# Patient Record
Sex: FEMALE | Race: OTHER | HISPANIC OR LATINO
[De-identification: names, ages, dates, MRNs, and addresses within clinical notes are randomized per-mention and may not be internally consistent; named-entity substitution may affect disease eponyms.]

---

## 2017-03-17 ENCOUNTER — RX RENEWAL (OUTPATIENT)
Age: 42
End: 2017-03-17

## 2017-04-20 ENCOUNTER — APPOINTMENT (OUTPATIENT)
Dept: PLASTIC SURGERY | Facility: CLINIC | Age: 42
End: 2017-04-20

## 2017-04-20 ENCOUNTER — OUTPATIENT (OUTPATIENT)
Dept: OUTPATIENT SERVICES | Facility: HOSPITAL | Age: 42
LOS: 1 days | End: 2017-04-20

## 2017-04-20 VITALS
TEMPERATURE: 97.8 F | HEART RATE: 82 BPM | SYSTOLIC BLOOD PRESSURE: 117 MMHG | BODY MASS INDEX: 28.41 KG/M2 | RESPIRATION RATE: 14 BRPM | DIASTOLIC BLOOD PRESSURE: 73 MMHG | WEIGHT: 176 LBS

## 2017-04-20 DIAGNOSIS — Z01.89 ENCOUNTER FOR OTHER SPECIFIED SPECIAL EXAMINATIONS: ICD-10-CM

## 2017-04-20 DIAGNOSIS — Z78.9 OTHER SPECIFIED HEALTH STATUS: ICD-10-CM

## 2017-05-16 ENCOUNTER — OUTPATIENT (OUTPATIENT)
Dept: OUTPATIENT SERVICES | Facility: HOSPITAL | Age: 42
LOS: 1 days | End: 2017-05-16

## 2017-05-16 ENCOUNTER — APPOINTMENT (OUTPATIENT)
Dept: PLASTIC SURGERY | Facility: CLINIC | Age: 42
End: 2017-05-16

## 2017-05-16 DIAGNOSIS — Z01.812 ENCOUNTER FOR PREPROCEDURAL LABORATORY EXAMINATION: ICD-10-CM

## 2017-05-23 ENCOUNTER — LABORATORY RESULT (OUTPATIENT)
Age: 42
End: 2017-05-23

## 2017-05-26 ENCOUNTER — APPOINTMENT (OUTPATIENT)
Dept: HEART AND VASCULAR | Facility: CLINIC | Age: 42
End: 2017-05-26

## 2017-06-02 ENCOUNTER — APPOINTMENT (OUTPATIENT)
Dept: HEART AND VASCULAR | Facility: CLINIC | Age: 42
End: 2017-06-02

## 2017-06-02 VITALS
RESPIRATION RATE: 12 BRPM | HEIGHT: 66 IN | SYSTOLIC BLOOD PRESSURE: 120 MMHG | WEIGHT: 180 LBS | BODY MASS INDEX: 28.93 KG/M2 | HEART RATE: 64 BPM | DIASTOLIC BLOOD PRESSURE: 70 MMHG

## 2017-06-02 DIAGNOSIS — Z83.49 FAMILY HISTORY OF OTHER ENDOCRINE, NUTRITIONAL AND METABOLIC DISEASES: ICD-10-CM

## 2017-06-02 DIAGNOSIS — Z82.49 FAMILY HISTORY OF ISCHEMIC HEART DISEASE AND OTHER DISEASES OF THE CIRCULATORY SYSTEM: ICD-10-CM

## 2017-06-02 DIAGNOSIS — Z83.3 FAMILY HISTORY OF DIABETES MELLITUS: ICD-10-CM

## 2017-06-02 DIAGNOSIS — M62.08 SEPARATION OF MUSCLE (NONTRAUMATIC), OTHER SITE: ICD-10-CM

## 2017-06-05 ENCOUNTER — RX RENEWAL (OUTPATIENT)
Age: 42
End: 2017-06-05

## 2017-06-05 PROBLEM — M62.08 RECTUS DIASTASIS OF LOWER ABDOMEN: Status: ACTIVE | Noted: 2017-06-05

## 2017-06-07 ENCOUNTER — OUTPATIENT (OUTPATIENT)
Dept: OUTPATIENT SERVICES | Facility: HOSPITAL | Age: 42
LOS: 1 days | Discharge: HOME | End: 2017-06-07

## 2017-06-12 DIAGNOSIS — Z41.1 ENCOUNTER FOR COSMETIC SURGERY: ICD-10-CM

## 2017-06-12 DIAGNOSIS — Z87.891 PERSONAL HISTORY OF NICOTINE DEPENDENCE: ICD-10-CM

## 2017-06-12 DIAGNOSIS — E65 LOCALIZED ADIPOSITY: ICD-10-CM

## 2017-06-12 DIAGNOSIS — D57.3 SICKLE-CELL TRAIT: ICD-10-CM

## 2017-06-12 DIAGNOSIS — M62.08 SEPARATION OF MUSCLE (NONTRAUMATIC), OTHER SITE: ICD-10-CM

## 2017-06-12 DIAGNOSIS — E66.9 OBESITY, UNSPECIFIED: ICD-10-CM

## 2017-06-15 ENCOUNTER — OUTPATIENT (OUTPATIENT)
Dept: OUTPATIENT SERVICES | Facility: HOSPITAL | Age: 42
LOS: 1 days | End: 2017-06-15

## 2017-06-15 ENCOUNTER — APPOINTMENT (OUTPATIENT)
Dept: PLASTIC SURGERY | Facility: CLINIC | Age: 42
End: 2017-06-15

## 2017-06-15 VITALS
HEART RATE: 72 BPM | TEMPERATURE: 97.5 F | RESPIRATION RATE: 14 BRPM | SYSTOLIC BLOOD PRESSURE: 110 MMHG | DIASTOLIC BLOOD PRESSURE: 70 MMHG

## 2017-06-15 DIAGNOSIS — Z09 ENCOUNTER FOR FOLLOW-UP EXAMINATION AFTER COMPLETED TREATMENT FOR CONDITIONS OTHER THAN MALIGNANT NEOPLASM: ICD-10-CM

## 2017-06-20 ENCOUNTER — OUTPATIENT (OUTPATIENT)
Dept: OUTPATIENT SERVICES | Facility: HOSPITAL | Age: 42
LOS: 1 days | End: 2017-06-20

## 2017-06-20 ENCOUNTER — APPOINTMENT (OUTPATIENT)
Dept: PLASTIC SURGERY | Facility: CLINIC | Age: 42
End: 2017-06-20

## 2017-06-20 DIAGNOSIS — Z09 ENCOUNTER FOR FOLLOW-UP EXAMINATION AFTER COMPLETED TREATMENT FOR CONDITIONS OTHER THAN MALIGNANT NEOPLASM: ICD-10-CM

## 2017-08-28 ENCOUNTER — APPOINTMENT (OUTPATIENT)
Dept: HEART AND VASCULAR | Facility: CLINIC | Age: 42
End: 2017-08-28
Payer: COMMERCIAL

## 2017-08-28 VITALS
HEART RATE: 75 BPM | SYSTOLIC BLOOD PRESSURE: 120 MMHG | BODY MASS INDEX: 27.32 KG/M2 | DIASTOLIC BLOOD PRESSURE: 70 MMHG | WEIGHT: 170 LBS | HEIGHT: 66 IN

## 2017-08-28 DIAGNOSIS — R31.29 OTHER MICROSCOPIC HEMATURIA: ICD-10-CM

## 2017-08-28 PROCEDURE — 99396 PREV VISIT EST AGE 40-64: CPT | Mod: 25

## 2017-08-28 PROCEDURE — 93000 ELECTROCARDIOGRAM COMPLETE: CPT

## 2017-08-28 RX ORDER — CEPHALEXIN 500 MG/1
500 TABLET ORAL
Qty: 28 | Refills: 0 | Status: DISCONTINUED | COMMUNITY
Start: 2017-06-20 | End: 2017-08-28

## 2017-08-28 RX ORDER — DIAZEPAM 5 MG/1
5 TABLET ORAL
Qty: 20 | Refills: 0 | Status: DISCONTINUED | COMMUNITY
Start: 2017-06-05 | End: 2017-08-28

## 2017-08-28 RX ORDER — ONDANSETRON 4 MG/1
4 TABLET ORAL
Qty: 10 | Refills: 0 | Status: DISCONTINUED | COMMUNITY
Start: 2017-06-05 | End: 2017-08-28

## 2017-08-28 RX ORDER — ENOXAPARIN SODIUM 100 MG/ML
40 INJECTION SUBCUTANEOUS
Qty: 3 | Refills: 0 | Status: DISCONTINUED | COMMUNITY
Start: 2017-06-05 | End: 2017-08-28

## 2017-08-28 RX ORDER — OXYCODONE AND ACETAMINOPHEN 5; 325 MG/1; MG/1
5-325 TABLET ORAL
Qty: 40 | Refills: 0 | Status: DISCONTINUED | COMMUNITY
Start: 2017-06-05 | End: 2017-08-28

## 2017-12-26 ENCOUNTER — RESULT REVIEW (OUTPATIENT)
Age: 42
End: 2017-12-26

## 2018-01-03 ENCOUNTER — APPOINTMENT (OUTPATIENT)
Dept: HEART AND VASCULAR | Facility: CLINIC | Age: 43
End: 2018-01-03
Payer: COMMERCIAL

## 2018-01-03 VITALS
HEART RATE: 76 BPM | HEIGHT: 66 IN | BODY MASS INDEX: 27.32 KG/M2 | SYSTOLIC BLOOD PRESSURE: 110 MMHG | TEMPERATURE: 98.2 F | DIASTOLIC BLOOD PRESSURE: 68 MMHG | WEIGHT: 170 LBS

## 2018-01-03 DIAGNOSIS — R73.9 HYPERGLYCEMIA, UNSPECIFIED: ICD-10-CM

## 2018-01-03 DIAGNOSIS — J11.1 INFLUENZA DUE TO UNIDENTIFIED INFLUENZA VIRUS WITH OTHER RESPIRATORY MANIFESTATIONS: ICD-10-CM

## 2018-01-03 PROCEDURE — 99213 OFFICE O/P EST LOW 20 MIN: CPT

## 2018-01-04 PROBLEM — R73.9 HYPERGLYCEMIA: Status: ACTIVE | Noted: 2018-01-04

## 2018-01-04 PROBLEM — J11.1 INFLUENZA: Status: ACTIVE | Noted: 2018-01-03

## 2018-01-08 LAB
25(OH)D3 SERPL-MCNC: 28 NG/ML
ALBUMIN SERPL ELPH-MCNC: 4.3 G/DL
ALP BLD-CCNC: 54 U/L
ALT SERPL-CCNC: 9 U/L
ANION GAP SERPL CALC-SCNC: 14 MMOL/L
AST SERPL-CCNC: 15 U/L
BASOPHILS # BLD AUTO: 0.01 K/UL
BASOPHILS NFR BLD AUTO: 0.2 %
BILIRUB SERPL-MCNC: 0.4 MG/DL
BUN SERPL-MCNC: 13 MG/DL
CALCIUM SERPL-MCNC: 9.4 MG/DL
CHLORIDE SERPL-SCNC: 103 MMOL/L
CO2 SERPL-SCNC: 25 MMOL/L
CREAT SERPL-MCNC: 0.81 MG/DL
EOSINOPHIL # BLD AUTO: 0.02 K/UL
EOSINOPHIL NFR BLD AUTO: 0.5 %
GLUCOSE SERPL-MCNC: 104 MG/DL
HCT VFR BLD CALC: 36.3 %
HGB BLD-MCNC: 11.9 G/DL
IMM GRANULOCYTES NFR BLD AUTO: 0.2 %
LPL SERPL-CCNC: 34 U/L
LYMPHOCYTES # BLD AUTO: 1.63 K/UL
LYMPHOCYTES NFR BLD AUTO: 37.1 %
MAN DIFF?: NORMAL
MCHC RBC-ENTMCNC: 30.1 PG
MCHC RBC-ENTMCNC: 32.8 GM/DL
MCV RBC AUTO: 91.7 FL
MONOCYTES # BLD AUTO: 0.33 K/UL
MONOCYTES NFR BLD AUTO: 7.5 %
NEUTROPHILS # BLD AUTO: 2.39 K/UL
NEUTROPHILS NFR BLD AUTO: 54.5 %
PLATELET # BLD AUTO: 181 K/UL
POTASSIUM SERPL-SCNC: 4.1 MMOL/L
PROT SERPL-MCNC: 7.6 G/DL
RBC # BLD: 3.96 M/UL
RBC # FLD: 12.9 %
SODIUM SERPL-SCNC: 142 MMOL/L
WBC # FLD AUTO: 4.39 K/UL

## 2018-05-14 ENCOUNTER — APPOINTMENT (OUTPATIENT)
Dept: MAMMOGRAPHY | Facility: HOSPITAL | Age: 43
End: 2018-05-14
Payer: COMMERCIAL

## 2018-05-14 ENCOUNTER — OUTPATIENT (OUTPATIENT)
Dept: OUTPATIENT SERVICES | Facility: HOSPITAL | Age: 43
LOS: 1 days | End: 2018-05-14
Payer: COMMERCIAL

## 2018-05-14 PROCEDURE — 77063 BREAST TOMOSYNTHESIS BI: CPT | Mod: 26

## 2018-05-14 PROCEDURE — 77067 SCR MAMMO BI INCL CAD: CPT

## 2018-05-14 PROCEDURE — 77063 BREAST TOMOSYNTHESIS BI: CPT

## 2018-05-14 PROCEDURE — 77067 SCR MAMMO BI INCL CAD: CPT | Mod: 26

## 2018-06-08 LAB
ALBUMIN SERPL ELPH-MCNC: 4.2 G/DL
ALP BLD-CCNC: 47 U/L
ALT SERPL-CCNC: 11 U/L
ANION GAP SERPL CALC-SCNC: 15 MMOL/L
AST SERPL-CCNC: 14 U/L
BASOPHILS # BLD AUTO: 0.01 K/UL
BASOPHILS NFR BLD AUTO: 0.2 %
BILIRUB SERPL-MCNC: 0.4 MG/DL
BUN SERPL-MCNC: 17 MG/DL
CALCIUM SERPL-MCNC: 9.3 MG/DL
CHLORIDE SERPL-SCNC: 103 MMOL/L
CO2 SERPL-SCNC: 22 MMOL/L
CREAT SERPL-MCNC: 0.82 MG/DL
EOSINOPHIL # BLD AUTO: 0.04 K/UL
EOSINOPHIL NFR BLD AUTO: 0.7 %
GLUCOSE SERPL-MCNC: 91 MG/DL
HCG SERPL-MCNC: <1 MIU/ML
HCT VFR BLD CALC: 36.5 %
HGB BLD-MCNC: 12.1 G/DL
IMM GRANULOCYTES NFR BLD AUTO: 0.2 %
INR PPP: 0.92 RATIO
LYMPHOCYTES # BLD AUTO: 2 K/UL
LYMPHOCYTES NFR BLD AUTO: 33.6 %
MAN DIFF?: NORMAL
MCHC RBC-ENTMCNC: 30.2 PG
MCHC RBC-ENTMCNC: 33.2 GM/DL
MCV RBC AUTO: 91 FL
MONOCYTES # BLD AUTO: 0.53 K/UL
MONOCYTES NFR BLD AUTO: 8.9 %
NEUTROPHILS # BLD AUTO: 3.37 K/UL
NEUTROPHILS NFR BLD AUTO: 56.4 %
PLATELET # BLD AUTO: 166 K/UL
POTASSIUM SERPL-SCNC: 4.2 MMOL/L
PROT SERPL-MCNC: 7.2 G/DL
PT BLD: 10.4 SEC
RBC # BLD: 4.01 M/UL
RBC # FLD: 13.3 %
SODIUM SERPL-SCNC: 140 MMOL/L
WBC # FLD AUTO: 5.96 K/UL

## 2018-08-27 ENCOUNTER — APPOINTMENT (OUTPATIENT)
Dept: HEART AND VASCULAR | Facility: CLINIC | Age: 43
End: 2018-08-27
Payer: COMMERCIAL

## 2018-08-27 ENCOUNTER — TRANSCRIPTION ENCOUNTER (OUTPATIENT)
Age: 43
End: 2018-08-27

## 2018-08-27 VITALS
WEIGHT: 182 LBS | HEART RATE: 66 BPM | TEMPERATURE: 98.3 F | BODY MASS INDEX: 29.25 KG/M2 | DIASTOLIC BLOOD PRESSURE: 60 MMHG | SYSTOLIC BLOOD PRESSURE: 102 MMHG | HEIGHT: 66 IN

## 2018-08-27 DIAGNOSIS — E55.9 VITAMIN D DEFICIENCY, UNSPECIFIED: ICD-10-CM

## 2018-08-27 PROCEDURE — 99396 PREV VISIT EST AGE 40-64: CPT | Mod: 25

## 2018-08-27 PROCEDURE — 93000 ELECTROCARDIOGRAM COMPLETE: CPT

## 2018-08-27 PROCEDURE — 36415 COLL VENOUS BLD VENIPUNCTURE: CPT

## 2018-08-27 RX ORDER — BIOTIN 10 MG
TABLET ORAL
Refills: 0 | Status: DISCONTINUED | COMMUNITY
End: 2018-08-27

## 2018-08-27 RX ORDER — IBUPROFEN 600 MG
TABLET ORAL
Refills: 0 | Status: DISCONTINUED | COMMUNITY
End: 2018-08-27

## 2018-08-27 RX ORDER — OSELTAMIVIR PHOSPHATE 75 MG/1
75 CAPSULE ORAL TWICE DAILY
Qty: 10 | Refills: 0 | Status: DISCONTINUED | COMMUNITY
Start: 2018-01-03 | End: 2018-08-27

## 2018-08-28 LAB
ALBUMIN SERPL ELPH-MCNC: 4.3 G/DL
ALP BLD-CCNC: 47 U/L
ALT SERPL-CCNC: 12 U/L
ANION GAP SERPL CALC-SCNC: 11 MMOL/L
AST SERPL-CCNC: 13 U/L
BASOPHILS # BLD AUTO: 0.01 K/UL
BASOPHILS NFR BLD AUTO: 0.1 %
BILIRUB SERPL-MCNC: 0.4 MG/DL
BUN SERPL-MCNC: 18 MG/DL
CALCIUM SERPL-MCNC: 8.8 MG/DL
CHLORIDE SERPL-SCNC: 102 MMOL/L
CHOLEST SERPL-MCNC: 187 MG/DL
CHOLEST/HDLC SERPL: 3.4 RATIO
CO2 SERPL-SCNC: 26 MMOL/L
CREAT SERPL-MCNC: 0.75 MG/DL
EOSINOPHIL # BLD AUTO: 0.07 K/UL
EOSINOPHIL NFR BLD AUTO: 1 %
GLUCOSE SERPL-MCNC: 95 MG/DL
HBA1C MFR BLD HPLC: 4.9 %
HCT VFR BLD CALC: 37.1 %
HDLC SERPL-MCNC: 55 MG/DL
HGB BLD-MCNC: 12.4 G/DL
IMM GRANULOCYTES NFR BLD AUTO: 0.4 %
LDLC SERPL CALC-MCNC: 105 MG/DL
LYMPHOCYTES # BLD AUTO: 1.75 K/UL
LYMPHOCYTES NFR BLD AUTO: 24.8 %
MAN DIFF?: NORMAL
MCHC RBC-ENTMCNC: 31.3 PG
MCHC RBC-ENTMCNC: 33.4 GM/DL
MCV RBC AUTO: 93.7 FL
MONOCYTES # BLD AUTO: 0.85 K/UL
MONOCYTES NFR BLD AUTO: 12 %
NEUTROPHILS # BLD AUTO: 4.35 K/UL
NEUTROPHILS NFR BLD AUTO: 61.7 %
PLATELET # BLD AUTO: 159 K/UL
POTASSIUM SERPL-SCNC: 4.3 MMOL/L
PROT SERPL-MCNC: 7.1 G/DL
RBC # BLD: 3.96 M/UL
RBC # FLD: 13.3 %
SODIUM SERPL-SCNC: 139 MMOL/L
TRIGL SERPL-MCNC: 136 MG/DL
TSH SERPL-ACNC: 1.25 UIU/ML
WBC # FLD AUTO: 7.06 K/UL

## 2018-09-10 DIAGNOSIS — M62.830 MUSCLE SPASM OF BACK: ICD-10-CM

## 2019-08-06 ENCOUNTER — APPOINTMENT (OUTPATIENT)
Dept: HEART AND VASCULAR | Facility: CLINIC | Age: 44
End: 2019-08-06
Payer: COMMERCIAL

## 2019-08-06 PROCEDURE — 86580 TB INTRADERMAL TEST: CPT

## 2019-08-07 ENCOUNTER — MED ADMIN CHARGE (OUTPATIENT)
Age: 44
End: 2019-08-07

## 2020-01-15 ENCOUNTER — APPOINTMENT (OUTPATIENT)
Dept: HEART AND VASCULAR | Facility: CLINIC | Age: 45
End: 2020-01-15
Payer: COMMERCIAL

## 2020-01-15 VITALS
HEART RATE: 59 BPM | HEIGHT: 66 IN | SYSTOLIC BLOOD PRESSURE: 118 MMHG | BODY MASS INDEX: 30.53 KG/M2 | DIASTOLIC BLOOD PRESSURE: 60 MMHG | WEIGHT: 190 LBS

## 2020-01-15 DIAGNOSIS — I83.899 VARICOSE VEINS OF UNSPECIFIED LOWER EXTREMITY WITH OTHER COMPLICATIONS: ICD-10-CM

## 2020-01-15 DIAGNOSIS — M54.5 LOW BACK PAIN: ICD-10-CM

## 2020-01-15 DIAGNOSIS — L81.9 DISORDER OF PIGMENTATION, UNSPECIFIED: ICD-10-CM

## 2020-01-15 PROCEDURE — 93970 EXTREMITY STUDY: CPT

## 2020-01-15 PROCEDURE — 99213 OFFICE O/P EST LOW 20 MIN: CPT

## 2020-02-05 ENCOUNTER — APPOINTMENT (OUTPATIENT)
Dept: MAMMOGRAPHY | Facility: HOSPITAL | Age: 45
End: 2020-02-05

## 2020-03-29 DIAGNOSIS — Z87.09 PERSONAL HISTORY OF OTHER DISEASES OF THE RESPIRATORY SYSTEM: ICD-10-CM

## 2020-03-29 PROBLEM — M54.5 BACK PAIN, LUMBOSACRAL: Status: ACTIVE | Noted: 2018-01-04

## 2020-03-29 PROBLEM — L81.9 HYPERPIGMENTATION OF SKIN: Status: ACTIVE | Noted: 2017-03-17

## 2020-03-29 PROBLEM — I83.899 SYMPTOMATIC VARICOSE VEINS: Status: ACTIVE | Noted: 2020-03-29

## 2020-04-16 ENCOUNTER — APPOINTMENT (OUTPATIENT)
Dept: HEART AND VASCULAR | Facility: CLINIC | Age: 45
End: 2020-04-16
Payer: COMMERCIAL

## 2020-04-16 DIAGNOSIS — R68.89 OTHER GENERAL SYMPTOMS AND SIGNS: ICD-10-CM

## 2020-04-16 PROCEDURE — 93306 TTE W/DOPPLER COMPLETE: CPT

## 2020-04-17 ENCOUNTER — OUTPATIENT (OUTPATIENT)
Dept: OUTPATIENT SERVICES | Facility: HOSPITAL | Age: 45
LOS: 1 days | End: 2020-04-17

## 2020-04-17 ENCOUNTER — APPOINTMENT (OUTPATIENT)
Dept: CT IMAGING | Facility: CLINIC | Age: 45
End: 2020-04-17
Payer: COMMERCIAL

## 2020-04-17 LAB
BASOPHILS # BLD AUTO: 0.03 K/UL
BASOPHILS NFR BLD AUTO: 0.4 %
CRP SERPL-MCNC: 1.24 MG/DL
DEPRECATED D DIMER PPP IA-ACNC: 611 NG/ML DDU
EOSINOPHIL # BLD AUTO: 0.07 K/UL
EOSINOPHIL NFR BLD AUTO: 1 %
FERRITIN SERPL-MCNC: 302 NG/ML
HCT VFR BLD CALC: 36.8 %
HGB BLD-MCNC: 12.2 G/DL
IMM GRANULOCYTES NFR BLD AUTO: 0.3 %
LYMPHOCYTES # BLD AUTO: 1.86 K/UL
LYMPHOCYTES NFR BLD AUTO: 27.3 %
MAN DIFF?: NORMAL
MCHC RBC-ENTMCNC: 30.2 PG
MCHC RBC-ENTMCNC: 33.2 GM/DL
MCV RBC AUTO: 91.1 FL
MONOCYTES # BLD AUTO: 0.53 K/UL
MONOCYTES NFR BLD AUTO: 7.8 %
NEUTROPHILS # BLD AUTO: 4.3 K/UL
NEUTROPHILS NFR BLD AUTO: 63.2 %
PLATELET # BLD AUTO: 223 K/UL
PROCALCITONIN SERPL-MCNC: 0.05 NG/ML
RBC # BLD: 4.04 M/UL
RBC # FLD: 12.5 %
WBC # FLD AUTO: 6.81 K/UL

## 2020-04-17 PROCEDURE — 71275 CT ANGIOGRAPHY CHEST: CPT | Mod: 26

## 2020-04-19 ENCOUNTER — APPOINTMENT (OUTPATIENT)
Dept: DISASTER EMERGENCY | Facility: CLINIC | Age: 45
End: 2020-04-19

## 2020-04-21 ENCOUNTER — TRANSCRIPTION ENCOUNTER (OUTPATIENT)
Age: 45
End: 2020-04-21

## 2020-04-25 ENCOUNTER — MESSAGE (OUTPATIENT)
Age: 45
End: 2020-04-25

## 2020-04-27 ENCOUNTER — APPOINTMENT (OUTPATIENT)
Dept: HEART AND VASCULAR | Facility: CLINIC | Age: 45
End: 2020-04-27
Payer: COMMERCIAL

## 2020-04-27 PROCEDURE — 99443: CPT

## 2020-04-29 LAB
SARS-COV-2 IGG SERPL IA-ACNC: 95.1 INDEX
SARS-COV-2 IGG SERPL QL IA: POSITIVE

## 2020-04-30 LAB
CRP SERPL-MCNC: 0.28 MG/DL
DEPRECATED D DIMER PPP IA-ACNC: 247 NG/ML DDU

## 2020-04-30 NOTE — HISTORY OF PRESENT ILLNESS
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 After Friday this past week when she felt relentless chest pain with deep breath and any significant movement or twisting, the pain improved over the weekend. The temperature at home averages 99F without Tylenol around the clock, which although not high, does not appear to be her normal temperature. She continues to feel exertional dyspnea and is particularly concerned about RT upper back and chest pain for which she remains on Zithromax 250mg daily (10 day course prescribed, 4 more to go) and is nearing completion of her 5 day course of PLaquenil at 200mg BID today given for documented COVID 19 PNA detected on chest CTA last week and active nasal swab +ity. She is additionally on Eliquis 5 mg BID for significantly elevated D dimer which measured 690 just last wee. We agreed she repeats inflammatory markers and D dimer this week as early as possible so that we can gauge her A/C protocol and length.\par \par We agreed on the following:\par \par 1. She repeats blood work with CRP, D dimer and obtains COVID Ab titers early this week\par 2. Continues T checks and performs trial of at home activity to assess for evidence of persistent exertional SOB\par 3. Continues communication with HR where I recommended she initiates the work responsibilities from home for a week prior to returning to her office given close proximity to other co-workers and office desk sharing. Ideally she should be COVID -ve prior to the return to office premisses under these circumstances and given symptomatic/clinical cvid infection relapse complicated with pneumonia. \par 4. Prior to returning to the office she should be symptoms and T free x 72 hrs w/o use of antipyretics and preferably swabbed for COVID PCR again to determie her carrier / shedding sttaus and protect office staff appropriately.\par 5. Phone F/U with me this Friday to go over labs and A/C protocol

## 2020-05-08 ENCOUNTER — APPOINTMENT (OUTPATIENT)
Dept: HEART AND VASCULAR | Facility: CLINIC | Age: 45
End: 2020-05-08
Payer: COMMERCIAL

## 2020-05-08 DIAGNOSIS — J18.9 PNEUMONIA, UNSPECIFIED ORGANISM: ICD-10-CM

## 2020-05-08 PROCEDURE — 99443: CPT

## 2020-05-12 PROBLEM — J18.9 ATYPICAL PNEUMONIA: Status: ACTIVE | Noted: 2020-04-18

## 2020-05-12 NOTE — HISTORY OF PRESENT ILLNESS
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 Her fever has not been elevated, and both of her children are doing better overall. and seemed to have recovered for the most part. She is concerned about symptom resurgence stating she had completed Zithromax course 3-4 days ago, and feels as she was feeling better while on antibiotics. \par \par PLAN:\par We agreed on the following: \par \par 1. She goes to the nearest [facility] to rule out strep throat\par 2. Repeats inflammatory marker levels (D dimer, CRP, and ferritin-all ordered) within next couple of days and increases Eliquis to 5mg BID for known elevated d Dimer\par 3. She was asked to  second refill of Zithromax 250mg x 14 days and take the whole 2 week course for "atypical pneumonia" regiment given the clear clinical relapse last week. She reports competing Plaquenil x 5 days over a week ago.\par 4. She should remain at home for additional week until CP, SOB and sore throat completely resolve for a minimum of 72 hrs prior to returning to work (I raised concern of co-worker exposure due to the small space and shared desk where she works, as viral shedding is known to persist up to 2-3 weeks post infection in COVID 19 patients).\par \par She understands and agrees with the plan; she will keep me abreast with the timeline of her testing so I can follow up on the results. \par  [Time Spent: ___ minutes] : I have spent [unfilled] minutes with the patient on the telephone

## 2020-05-15 ENCOUNTER — APPOINTMENT (OUTPATIENT)
Dept: HEART AND VASCULAR | Facility: CLINIC | Age: 45
End: 2020-05-15
Payer: COMMERCIAL

## 2020-05-15 PROCEDURE — 99443: CPT

## 2020-05-18 NOTE — HISTORY OF PRESENT ILLNESS
[Verbal consent obtained from patient] : the patient, [unfilled] [FreeTextEntry1] : This was our third televisit follow up after established COVID 19 pneumonia.  Harrison is getting better, however still experiencing some right sided CP with deep breathing. There is no fever and she has been off Tylenol for over a week now. She remains on Zithromax which she started 6 days ago and is due to complete full 14 day course as protocol for atypical pneumonia. On further questioning DR. Harrison  is reporting her palpitations, which were of concern the week she went back to work, are getting better but are still occurring at rest, and usually at night. There is no associated dizziness or lightheadedness. They have clearly improved since she is back on antibiotics. \par Now, maureen asked about her activity tolerance and challenge with walking she does not seem to be able to complete more than 1/2 block without experiencing limiting SOB. This is of concern, particularly as her most recent lab-work from yesterday reveals persistently elevated D dimer which is in fact slightly increasing. When asked about adherence to A/C she admits to full compliance with Eliquis 5mg BID without unusual bruising or bleeding. \par Under the circumstances, I would like to defer her return to work until further follow up in a week when I want her to repeat her COVID 19 testing with PCR / nasal swab. 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 After all we are requesting them to test negative before arriving to the office.  \par \par PLAN:\par 1. I recommend she completes the remaining week of Zithromax and does increase activity (mostly walking) so that her functional capacity can be properly assessed. \par 2. She is tentatively scheduled for COVID 19 PCR nasal swab next Friday May 22nd, and if negative return to work in our office may be tentatively expected for Tuesday May 26th. \par 3. She understands the concerns, and is OK with extension of sick leave under the circumstances. 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 \par 4. be filling the addendum to her sick leave paperwork early next week, and our next follow up is scheduled for Friday, May 22nd. \par 5. She will complete her antibiotics, and knows to call immediately if any unforeseen issues or interval worsening.   [Time Spent: ___ minutes] : I have spent [unfilled] minutes with the patient on the telephone

## 2020-05-22 ENCOUNTER — APPOINTMENT (OUTPATIENT)
Dept: HEART AND VASCULAR | Facility: CLINIC | Age: 45
End: 2020-05-22
Payer: COMMERCIAL

## 2020-05-22 PROCEDURE — 99443: CPT

## 2020-06-01 ENCOUNTER — APPOINTMENT (OUTPATIENT)
Dept: HEART AND VASCULAR | Facility: CLINIC | Age: 45
End: 2020-06-01
Payer: COMMERCIAL

## 2020-06-01 PROCEDURE — 99442: CPT

## 2020-06-01 NOTE — HISTORY OF PRESENT ILLNESS
[Home] : at home, [unfilled] , at the time of the visit. 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 She remains stable and is overall better but still worried with persistent right sided CP both with increased activity and deep breathing. She completed 2 week long ZIthromax 250mg QD course and at this point and remains on anticoagulation with Eliquis 5 mg BID, supportive therapy, Mg supplements, and is resting. Her palpitations are better, and there has been no fever or cough any longer. \par \par We agreed on the following:\par \par 1. She should repeat CTA chest to clarify if there has been increase in infiltration or secondary chest process to account for the persistent pain.\par 2.  Set up an appointment with pulmonary consultant once CT is done and resulted.\par 3. If remains clinically stable and improving by the end of the week she should be able  to resume her work duties next week Monday 6/8/2020.\par 4. We will follow up again this Friday to go over all of the results. [Time Spent: ___ minutes] : I have spent [unfilled] minutes with the patient on the telephone

## 2020-06-05 ENCOUNTER — APPOINTMENT (OUTPATIENT)
Dept: HEART AND VASCULAR | Facility: CLINIC | Age: 45
End: 2020-06-05
Payer: COMMERCIAL

## 2020-06-05 PROCEDURE — 99443: CPT

## 2020-06-05 NOTE — HISTORY OF PRESENT ILLNESS
[Home] : at home, [unfilled] , at the time of the visit. [Verbal consent obtained from patient] : the patient, [unfilled] [FreeTextEntry1] : This was our second follow up this week regarding finalizing clearance to return to work.\par Although CT chest was ordered it has not been done and will be so later next week prior to referral to pulmonary. 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 She had tested her stamina and walked around her home few times reporting complete resolution of palpitations and exertional dyspnea at current activity level. \par The chest pain she was referring to earlier has gotten better, persists and intermittently shoots from the Rt upper chest (just below her clavicle) to the back and Rt shoulder /arm, but is, as she claims,                   " manageable" and not requiring any pain Rx at this time. She sounds better and feel her brain fog has cleared. She feels she is ready to go back to work.\par \par We agreed on the following:\par 1. She remains at home over the weekend and continues her vitamin Rx and Mg supplements at this time\par 2. Return back to work on Monday 6/8/2020, but notify me over the weekend if any change or status worsening.\par 3. Continue Eliquis 5 mg BID and repeat CRP/D dimer on Monday upon her return to work.\par 4> Plan on scheduling chest CTA later next week prior to scheduling appointment with DR. AVA Reynolds (pulmonary) on elective basis. \par 5. PLan on Right Jugular/Subclavian/Axillary Duplex study next Wednesday in our office. [Time Spent: ___ minutes] : I have spent [unfilled] minutes with the patient on the telephone

## 2020-06-09 ENCOUNTER — EMERGENCY (EMERGENCY)
Facility: HOSPITAL | Age: 45
LOS: 1 days | Discharge: HOME | End: 2020-06-09
Attending: EMERGENCY MEDICINE | Admitting: EMERGENCY MEDICINE
Payer: OTHER MISCELLANEOUS

## 2020-06-09 ENCOUNTER — APPOINTMENT (OUTPATIENT)
Dept: HEART AND VASCULAR | Facility: CLINIC | Age: 45
End: 2020-06-09
Payer: COMMERCIAL

## 2020-06-09 ENCOUNTER — NON-APPOINTMENT (OUTPATIENT)
Age: 45
End: 2020-06-09

## 2020-06-09 VITALS
OXYGEN SATURATION: 100 % | HEIGHT: 66 IN | HEART RATE: 75 BPM | TEMPERATURE: 98 F | RESPIRATION RATE: 16 BRPM | SYSTOLIC BLOOD PRESSURE: 122 MMHG | WEIGHT: 184.97 LBS | DIASTOLIC BLOOD PRESSURE: 79 MMHG

## 2020-06-09 VITALS
OXYGEN SATURATION: 97 % | TEMPERATURE: 98 F | HEART RATE: 70 BPM | RESPIRATION RATE: 17 BRPM | SYSTOLIC BLOOD PRESSURE: 117 MMHG | DIASTOLIC BLOOD PRESSURE: 71 MMHG

## 2020-06-09 VITALS
SYSTOLIC BLOOD PRESSURE: 128 MMHG | HEIGHT: 66 IN | DIASTOLIC BLOOD PRESSURE: 74 MMHG | TEMPERATURE: 98.4 F | HEART RATE: 104 BPM

## 2020-06-09 DIAGNOSIS — Z88.9 ALLERGY STATUS TO UNSPECIFIED DRUGS, MEDICAMENTS AND BIOLOGICAL SUBSTANCES: ICD-10-CM

## 2020-06-09 DIAGNOSIS — Z87.09 PERSONAL HISTORY OF OTHER DISEASES OF THE RESPIRATORY SYSTEM: ICD-10-CM

## 2020-06-09 LAB
ALBUMIN SERPL ELPH-MCNC: 4.1 G/DL — SIGNIFICANT CHANGE UP (ref 3.3–5)
ALP SERPL-CCNC: 47 U/L — SIGNIFICANT CHANGE UP (ref 40–120)
ALT FLD-CCNC: 13 U/L — SIGNIFICANT CHANGE UP (ref 10–45)
AMPHET UR-MCNC: NEGATIVE — SIGNIFICANT CHANGE UP
ANION GAP SERPL CALC-SCNC: 15 MMOL/L — SIGNIFICANT CHANGE UP (ref 5–17)
APPEARANCE UR: CLEAR — SIGNIFICANT CHANGE UP
APTT BLD: 28.1 SEC — SIGNIFICANT CHANGE UP (ref 27.5–36.3)
AST SERPL-CCNC: 20 U/L — SIGNIFICANT CHANGE UP (ref 10–40)
BARBITURATES UR SCN-MCNC: NEGATIVE — SIGNIFICANT CHANGE UP
BASOPHILS # BLD AUTO: 0.03 K/UL — SIGNIFICANT CHANGE UP (ref 0–0.2)
BASOPHILS NFR BLD AUTO: 0.5 % — SIGNIFICANT CHANGE UP (ref 0–2)
BENZODIAZ UR-MCNC: NEGATIVE — SIGNIFICANT CHANGE UP
BILIRUB SERPL-MCNC: 0.5 MG/DL — SIGNIFICANT CHANGE UP (ref 0.2–1.2)
BILIRUB UR-MCNC: NEGATIVE — SIGNIFICANT CHANGE UP
BUN SERPL-MCNC: 12 MG/DL — SIGNIFICANT CHANGE UP (ref 7–23)
CALCIUM SERPL-MCNC: 9 MG/DL — SIGNIFICANT CHANGE UP (ref 8.4–10.5)
CHLORIDE SERPL-SCNC: 102 MMOL/L — SIGNIFICANT CHANGE UP (ref 96–108)
CO2 SERPL-SCNC: 20 MMOL/L — LOW (ref 22–31)
COCAINE METAB.OTHER UR-MCNC: NEGATIVE — SIGNIFICANT CHANGE UP
COLOR SPEC: YELLOW — SIGNIFICANT CHANGE UP
CREAT SERPL-MCNC: 0.63 MG/DL — SIGNIFICANT CHANGE UP (ref 0.5–1.3)
CRP SERPL-MCNC: 0.43 MG/DL — HIGH (ref 0–0.4)
D DIMER BLD IA.RAPID-MCNC: 340 NG/ML DDU — HIGH
DIFF PNL FLD: NEGATIVE — SIGNIFICANT CHANGE UP
EOSINOPHIL # BLD AUTO: 0.03 K/UL — SIGNIFICANT CHANGE UP (ref 0–0.5)
EOSINOPHIL NFR BLD AUTO: 0.5 % — SIGNIFICANT CHANGE UP (ref 0–6)
FERRITIN SERPL-MCNC: 201 NG/ML — HIGH (ref 15–150)
FIBRINOGEN PPP-MCNC: 259 MG/DL — SIGNIFICANT CHANGE UP (ref 258–438)
GLUCOSE SERPL-MCNC: 99 MG/DL — SIGNIFICANT CHANGE UP (ref 70–99)
GLUCOSE UR QL: NEGATIVE — SIGNIFICANT CHANGE UP
HCG UR QL: NEGATIVE — SIGNIFICANT CHANGE UP
HCT VFR BLD CALC: 36.6 % — SIGNIFICANT CHANGE UP (ref 34.5–45)
HGB BLD-MCNC: 12.5 G/DL — SIGNIFICANT CHANGE UP (ref 11.5–15.5)
IMM GRANULOCYTES NFR BLD AUTO: 0.2 % — SIGNIFICANT CHANGE UP (ref 0–1.5)
INR BLD: 1.08 — SIGNIFICANT CHANGE UP (ref 0.88–1.16)
KETONES UR-MCNC: NEGATIVE — SIGNIFICANT CHANGE UP
LACTATE SERPL-SCNC: 0.9 MMOL/L — SIGNIFICANT CHANGE UP (ref 0.5–2)
LDH SERPL L TO P-CCNC: 162 U/L — SIGNIFICANT CHANGE UP (ref 50–242)
LEUKOCYTE ESTERASE UR-ACNC: NEGATIVE — SIGNIFICANT CHANGE UP
LYMPHOCYTES # BLD AUTO: 2.09 K/UL — SIGNIFICANT CHANGE UP (ref 1–3.3)
LYMPHOCYTES # BLD AUTO: 31.7 % — SIGNIFICANT CHANGE UP (ref 13–44)
MAGNESIUM SERPL-MCNC: 1.9 MG/DL — SIGNIFICANT CHANGE UP (ref 1.6–2.6)
MCHC RBC-ENTMCNC: 30.5 PG — SIGNIFICANT CHANGE UP (ref 27–34)
MCHC RBC-ENTMCNC: 34.2 GM/DL — SIGNIFICANT CHANGE UP (ref 32–36)
MCV RBC AUTO: 89.3 FL — SIGNIFICANT CHANGE UP (ref 80–100)
METHADONE UR-MCNC: NEGATIVE — SIGNIFICANT CHANGE UP
MONOCYTES # BLD AUTO: 0.46 K/UL — SIGNIFICANT CHANGE UP (ref 0–0.9)
MONOCYTES NFR BLD AUTO: 7 % — SIGNIFICANT CHANGE UP (ref 2–14)
NEUTROPHILS # BLD AUTO: 3.97 K/UL — SIGNIFICANT CHANGE UP (ref 1.8–7.4)
NEUTROPHILS NFR BLD AUTO: 60.1 % — SIGNIFICANT CHANGE UP (ref 43–77)
NITRITE UR-MCNC: NEGATIVE — SIGNIFICANT CHANGE UP
NRBC # BLD: 0 /100 WBCS — SIGNIFICANT CHANGE UP (ref 0–0)
OPIATES UR-MCNC: NEGATIVE — SIGNIFICANT CHANGE UP
PCP SPEC-MCNC: SIGNIFICANT CHANGE UP
PCP UR-MCNC: NEGATIVE — SIGNIFICANT CHANGE UP
PH UR: 6.5 — SIGNIFICANT CHANGE UP (ref 5–8)
PHOSPHATE SERPL-MCNC: 3.6 MG/DL — SIGNIFICANT CHANGE UP (ref 2.5–4.5)
PLATELET # BLD AUTO: 158 K/UL — SIGNIFICANT CHANGE UP (ref 150–400)
POTASSIUM SERPL-MCNC: 3.7 MMOL/L — SIGNIFICANT CHANGE UP (ref 3.5–5.3)
POTASSIUM SERPL-SCNC: 3.7 MMOL/L — SIGNIFICANT CHANGE UP (ref 3.5–5.3)
PROCALCITONIN SERPL-MCNC: 0.04 NG/ML — SIGNIFICANT CHANGE UP (ref 0.02–0.1)
PROT SERPL-MCNC: 7.4 G/DL — SIGNIFICANT CHANGE UP (ref 6–8.3)
PROT UR-MCNC: NEGATIVE MG/DL — SIGNIFICANT CHANGE UP
PROTHROM AB SERPL-ACNC: 12.3 SEC — SIGNIFICANT CHANGE UP (ref 10–12.9)
RBC # BLD: 4.1 M/UL — SIGNIFICANT CHANGE UP (ref 3.8–5.2)
RBC # FLD: 13.1 % — SIGNIFICANT CHANGE UP (ref 10.3–14.5)
SARS-COV-2 RNA SPEC QL NAA+PROBE: SIGNIFICANT CHANGE UP
SODIUM SERPL-SCNC: 137 MMOL/L — SIGNIFICANT CHANGE UP (ref 135–145)
SP GR SPEC: <=1.005 — SIGNIFICANT CHANGE UP (ref 1–1.03)
T4 AB SER-ACNC: 7.52 UG/DL — SIGNIFICANT CHANGE UP (ref 3.17–11.72)
THC UR QL: NEGATIVE — SIGNIFICANT CHANGE UP
TROPONIN T SERPL-MCNC: <0.01 NG/ML — SIGNIFICANT CHANGE UP (ref 0–0.01)
TROPONIN T SERPL-MCNC: <0.01 NG/ML — SIGNIFICANT CHANGE UP (ref 0–0.01)
TSH SERPL-MCNC: 1.68 UIU/ML — SIGNIFICANT CHANGE UP (ref 0.35–4.94)
UROBILINOGEN FLD QL: 0.2 E.U./DL — SIGNIFICANT CHANGE UP
WBC # BLD: 6.59 K/UL — SIGNIFICANT CHANGE UP (ref 3.8–10.5)
WBC # FLD AUTO: 6.59 K/UL — SIGNIFICANT CHANGE UP (ref 3.8–10.5)

## 2020-06-09 PROCEDURE — 36415 COLL VENOUS BLD VENIPUNCTURE: CPT

## 2020-06-09 PROCEDURE — 84100 ASSAY OF PHOSPHORUS: CPT

## 2020-06-09 PROCEDURE — 85610 PROTHROMBIN TIME: CPT

## 2020-06-09 PROCEDURE — 83605 ASSAY OF LACTIC ACID: CPT

## 2020-06-09 PROCEDURE — 93005 ELECTROCARDIOGRAM TRACING: CPT

## 2020-06-09 PROCEDURE — 93000 ELECTROCARDIOGRAM COMPLETE: CPT

## 2020-06-09 PROCEDURE — 86140 C-REACTIVE PROTEIN: CPT

## 2020-06-09 PROCEDURE — 85730 THROMBOPLASTIN TIME PARTIAL: CPT

## 2020-06-09 PROCEDURE — 93306 TTE W/DOPPLER COMPLETE: CPT

## 2020-06-09 PROCEDURE — 87635 SARS-COV-2 COVID-19 AMP PRB: CPT

## 2020-06-09 PROCEDURE — 70498 CT ANGIOGRAPHY NECK: CPT | Mod: 26

## 2020-06-09 PROCEDURE — 71275 CT ANGIOGRAPHY CHEST: CPT | Mod: 26

## 2020-06-09 PROCEDURE — 82728 ASSAY OF FERRITIN: CPT

## 2020-06-09 PROCEDURE — 82962 GLUCOSE BLOOD TEST: CPT

## 2020-06-09 PROCEDURE — 70450 CT HEAD/BRAIN W/O DYE: CPT | Mod: 26

## 2020-06-09 PROCEDURE — 83615 LACTATE (LD) (LDH) ENZYME: CPT

## 2020-06-09 PROCEDURE — 81025 URINE PREGNANCY TEST: CPT

## 2020-06-09 PROCEDURE — 80307 DRUG TEST PRSMV CHEM ANLYZR: CPT

## 2020-06-09 PROCEDURE — 0042T: CPT

## 2020-06-09 PROCEDURE — 84484 ASSAY OF TROPONIN QUANT: CPT

## 2020-06-09 PROCEDURE — 70551 MRI BRAIN STEM W/O DYE: CPT

## 2020-06-09 PROCEDURE — 70498 CT ANGIOGRAPHY NECK: CPT

## 2020-06-09 PROCEDURE — 80053 COMPREHEN METABOLIC PANEL: CPT

## 2020-06-09 PROCEDURE — 71045 X-RAY EXAM CHEST 1 VIEW: CPT

## 2020-06-09 PROCEDURE — 70450 CT HEAD/BRAIN W/O DYE: CPT

## 2020-06-09 PROCEDURE — 85379 FIBRIN DEGRADATION QUANT: CPT

## 2020-06-09 PROCEDURE — 71045 X-RAY EXAM CHEST 1 VIEW: CPT | Mod: 26

## 2020-06-09 PROCEDURE — 84436 ASSAY OF TOTAL THYROXINE: CPT

## 2020-06-09 PROCEDURE — 93010 ELECTROCARDIOGRAM REPORT: CPT

## 2020-06-09 PROCEDURE — 99291 CRITICAL CARE FIRST HOUR: CPT

## 2020-06-09 PROCEDURE — 84479 ASSAY OF THYROID (T3 OR T4): CPT

## 2020-06-09 PROCEDURE — 99291 CRITICAL CARE FIRST HOUR: CPT | Mod: 25

## 2020-06-09 PROCEDURE — 71275 CT ANGIOGRAPHY CHEST: CPT

## 2020-06-09 PROCEDURE — 70496 CT ANGIOGRAPHY HEAD: CPT

## 2020-06-09 PROCEDURE — 99214 OFFICE O/P EST MOD 30 MIN: CPT | Mod: 25

## 2020-06-09 PROCEDURE — 85025 COMPLETE CBC W/AUTO DIFF WBC: CPT

## 2020-06-09 PROCEDURE — 87086 URINE CULTURE/COLONY COUNT: CPT

## 2020-06-09 PROCEDURE — 70496 CT ANGIOGRAPHY HEAD: CPT | Mod: 26

## 2020-06-09 PROCEDURE — 70551 MRI BRAIN STEM W/O DYE: CPT | Mod: 26

## 2020-06-09 PROCEDURE — 85384 FIBRINOGEN ACTIVITY: CPT

## 2020-06-09 PROCEDURE — 81003 URINALYSIS AUTO W/O SCOPE: CPT

## 2020-06-09 PROCEDURE — 84145 PROCALCITONIN (PCT): CPT

## 2020-06-09 PROCEDURE — 84443 ASSAY THYROID STIM HORMONE: CPT

## 2020-06-09 PROCEDURE — 83735 ASSAY OF MAGNESIUM: CPT

## 2020-06-09 RX ORDER — SODIUM CHLORIDE 9 MG/ML
1000 INJECTION INTRAMUSCULAR; INTRAVENOUS; SUBCUTANEOUS ONCE
Refills: 0 | Status: COMPLETED | OUTPATIENT
Start: 2020-06-09 | End: 2020-06-09

## 2020-06-09 RX ORDER — ACETAMINOPHEN 500 MG
1000 TABLET ORAL ONCE
Refills: 0 | Status: COMPLETED | OUTPATIENT
Start: 2020-06-09 | End: 2020-06-09

## 2020-06-09 RX ADMIN — SODIUM CHLORIDE 1000 MILLILITER(S): 9 INJECTION INTRAMUSCULAR; INTRAVENOUS; SUBCUTANEOUS at 16:05

## 2020-06-09 RX ADMIN — Medication 1000 MILLIGRAM(S): at 19:49

## 2020-06-09 NOTE — DISCUSSION/SUMMARY
[FreeTextEntry1] : EMS called to bring to [facility] ER\par Needs the following:\par CT angiogram chest\par CT brain\par blood work inclusing D dimer, CRP, ferittin, \par at least 24 hrs monitoring.

## 2020-06-09 NOTE — ED ADULT TRIAGE NOTE - CHIEF COMPLAINT QUOTE
Pt BIBA CO Dizziness and near syncope.  Per EMS, pt is recovering from COVID (Neg test on May 23) and today at work experience Dizziness and near syncope and her PMD recommends a CTA of head and chest.  #18 placed to Right AC.

## 2020-06-09 NOTE — ED PROVIDER NOTE - CARE PROVIDERS DIRECT ADDRESSES
[email or web address]

## 2020-06-09 NOTE — CONSULT NOTE ADULT - SUBJECTIVE AND OBJECTIVE BOX
**STROKE CODE CONSULT NOTE**    Last known well time/Time of onset of symptoms: 6/9/20 13:00    HPI: 43 yo F with pmhx sickle cell trait, and COVID+ with symptoms beginning on March 25th (tested positive initially on April 1st, since w/ 2 rounds of Azithromycin and hydroxychloroquine of which course ended 3 weeks ago), and placed on Eliquis 5mg BID empirically after noted D-dimer elevation. Pt had CT on April 13th showing b/l COVID-like PNA, but did not need hospitalization at the time. Since, pt has had persistent symptoms including constant right-upper chest discomfort radiating to the back. No changes with inspiration or exertion, and symptoms have improved somewhat since. However, pt is now endorsing a constant fogginess, forgetfulness and headaches with associated SOB w/ exertion & palpitations. Denies any fevers, cough, nausea, vomiting or diarrhea. Today while at her office, pt had new onset episode of lightheadedness while sitting in her chair, during which she also noted palpitations and a tingling sensation to the left side of her lower face, since resolved.    PAST MEDICAL & SURGICAL HISTORY:      FAMILY HISTORY:      SOCIAL HISTORY:  Denies smoking, drinking, or drug use    ROS:  Constitutional: No fever, weight loss or fatigue  Eyes: No eye pain, visual disturbances, or discharge  ENMT:  No difficulty hearing, tinnitus, vertigo; No sinus or throat pain  Neck: No pain or stiffness  Respiratory: No cough, wheezing, chills or hemoptysis  Cardiovascular: No chest pain, palpitations, shortness of breath, dizziness or leg swelling  Gastrointestinal: No abdominal pain. No nausea, vomiting or hematemesis; No diarrhea or constipation. No hematochezia.  Genitourinary: No dysuria, frequency, hematuria or incontinence  Neurological: As per HPI  Skin: No itching, burning, rashes or lesions   Endocrine: No heat or cold intolerance; No hair loss  Musculoskeletal: No joint pain or swelling; No muscle, back or extremity pain  Psychiatric: No depression, anxiety, mood swings or difficulty sleeping  Heme/Lymph: No easy bruising or bleeding gums    MEDICATIONS  (STANDING):  sodium chloride 0.9% Bolus 1000 milliLiter(s) IV Bolus once    MEDICATIONS  (PRN):      Allergies    influenza virus vaccine, inactivated (Unknown)  Pineapple (Rash)    Intolerances        Vital Signs Last 24 Hrs  T(C): 36.7 (09 Jun 2020 13:45), Max: 36.7 (09 Jun 2020 13:45)  T(F): 98.1 (09 Jun 2020 13:45), Max: 98.1 (09 Jun 2020 13:45)  HR: 75 (09 Jun 2020 13:45) (75 - 75)  BP: 122/79 (09 Jun 2020 13:45) (122/79 - 122/79)  BP(mean): --  RR: 16 (09 Jun 2020 13:45) (16 - 16)  SpO2: 100% (09 Jun 2020 13:45) (100% - 100%)    PHYSICAL EXAM:  Constitutional: WDWN; NAD  Cardiovascular: RRR, no appreciable murmurs; no carotid bruits  Neurologic:  -Mental status: Awake, alert and oriented to person, place, and time. Speech is fluent with intact naming, repetition, and comprehension.  Recent and remote memory intact.  Attention/concentration intact.  No dysarthria, no aphasia.  Fund of knowledge appropriate.    -Cranial nerves:  II: Visual fields full to confrontation. Pupils PERRL  III, IV, VI: extraocular movements are intact without nystagmus  V: Facial sensation intact V1 through V3 intact bilaterally  VII: Face is symmetric with normal eye closure and smile, no facial droop.  VIII: hearing is intact to finger rub  IX, X: uvula is midline and soft palate rises symmetrically  XI: Head turning and shoulder shrug intact  XII: Tongue protrudes in the midline    -Motor:  Normal bulk and tone, strength 5/5 in bilateral upper and lower extremities.   strength 5/5.  Rapid alternating movements intact and symmetric. No pronator drift.   -Sensation: Intact to light touch, proprioception, and pinprick.  Intact pain and temperature sense. No neglect. Romberg negative.  -Coordination: No dysmetria on finger-to-nose and heel-to-shin.  No clumsiness.  -Reflexes: 2+ in upper and lower extremities, downgoing toes bilaterally  -Gait: Narrow and steady. No ataxia. Able to perform tandem and heel to toe walk.    NIHSS:  ICH:    Fingerstick Blood Glucose: CAPILLARY BLOOD GLUCOSE  89 (09 Jun 2020 14:21)      POCT Blood Glucose.: 89 mg/dL (09 Jun 2020 13:53)       LABS:      RADIOLOGY & ADDITIONAL STUDIES:    IV-tPA (Y/N):       N                            Bolus time:  Reason IV-tPA not given: Rapidly improving neurologic deficit    ASSESSMENT/PLAN:    44y yo Female w/ PMH coming in with  Likely cardiac **STROKE CODE CONSULT NOTE**    Last known well time/Time of onset of symptoms: 6/9/20 13:00    HPI: 45 yo F with pmhx sickle cell trait, and COVID+ with symptoms beginning on March 25th (tested positive initially on April 1st, since w/ 2 rounds of Azithromycin and hydroxychloroquine of which course ended 3 weeks ago), and placed on Eliquis 5mg BID empirically after noted D-dimer elevation.     COVID course:   Pt had CT on April 13th showing b/l COVID-like PNA, but did not need hospitalization at the time. Since, pt has had persistent symptoms including constant right-upper chest discomfort radiating to the back. No changes with inspiration or exertion, and symptoms have improved somewhat since. However, pt is now endorsing a constant fogginess, forgetfulness and headaches with associated SOB w/ exertion & palpitations. Denies any fevers, cough, nausea, vomiting or diarrhea. Today while at her office, pt had new onset episode of lightheadedness while sitting in her chair, during which she also noted palpitations and a tingling sensation to the left side of her lower face, since resolved.    PAST MEDICAL & SURGICAL HISTORY:      FAMILY HISTORY:      SOCIAL HISTORY:  Denies smoking, drinking, or drug use    ROS:  Constitutional: No fever, weight loss or fatigue  Eyes: No eye pain, visual disturbances, or discharge  ENMT:  No difficulty hearing, tinnitus, vertigo; No sinus or throat pain  Neck: No pain or stiffness  Respiratory: No cough, wheezing, chills or hemoptysis  Cardiovascular: No chest pain, palpitations, shortness of breath, dizziness or leg swelling  Gastrointestinal: No abdominal pain. No nausea, vomiting or hematemesis; No diarrhea or constipation. No hematochezia.  Genitourinary: No dysuria, frequency, hematuria or incontinence  Neurological: As per HPI  Skin: No itching, burning, rashes or lesions   Endocrine: No heat or cold intolerance; No hair loss  Musculoskeletal: No joint pain or swelling; No muscle, back or extremity pain  Psychiatric: No depression, anxiety, mood swings or difficulty sleeping  Heme/Lymph: No easy bruising or bleeding gums    MEDICATIONS  (STANDING):  sodium chloride 0.9% Bolus 1000 milliLiter(s) IV Bolus once    MEDICATIONS  (PRN):      Allergies    influenza virus vaccine, inactivated (Unknown)  Pineapple (Rash)    Intolerances        Vital Signs Last 24 Hrs  T(C): 36.7 (09 Jun 2020 13:45), Max: 36.7 (09 Jun 2020 13:45)  T(F): 98.1 (09 Jun 2020 13:45), Max: 98.1 (09 Jun 2020 13:45)  HR: 75 (09 Jun 2020 13:45) (75 - 75)  BP: 122/79 (09 Jun 2020 13:45) (122/79 - 122/79)  BP(mean): --  RR: 16 (09 Jun 2020 13:45) (16 - 16)  SpO2: 100% (09 Jun 2020 13:45) (100% - 100%)    PHYSICAL EXAM:  Constitutional: WDWN; NAD  Cardiovascular: RRR, no appreciable murmurs; no carotid bruits  Neurologic:  -Mental status: Awake, alert and oriented to person, place, and time. Speech is fluent with intact naming, repetition, and comprehension.  Recent and remote memory intact.  Attention/concentration intact.  No dysarthria, no aphasia.  Fund of knowledge appropriate.    -Cranial nerves:  II: Visual fields full to confrontation. Pupils PERRL  III, IV, VI: extraocular movements are intact without nystagmus  V: Facial sensation intact V1 through V3 intact bilaterally  VII: Face is symmetric with normal eye closure and smile, no facial droop.  VIII: hearing is intact to finger rub  IX, X: uvula is midline and soft palate rises symmetrically  XI: Head turning and shoulder shrug intact  XII: Tongue protrudes in the midline    -Motor:  Normal bulk and tone, strength 5/5 in bilateral upper and lower extremities.   strength 5/5.  Rapid alternating movements intact and symmetric. No pronator drift.   -Sensation: Intact to light touch, proprioception, and pinprick.  Intact pain and temperature sense. No neglect. Romberg negative.  -Coordination: No dysmetria on finger-to-nose and heel-to-shin.  No clumsiness.  -Reflexes: 2+ in upper and lower extremities, downgoing toes bilaterally  -Gait: Narrow and steady. No ataxia. Able to perform tandem and heel to toe walk.    NIHSS:  ICH:    Fingerstick Blood Glucose: CAPILLARY BLOOD GLUCOSE  89 (09 Jun 2020 14:21)      POCT Blood Glucose.: 89 mg/dL (09 Jun 2020 13:53)       LABS:      RADIOLOGY & ADDITIONAL STUDIES:    IV-tPA (Y/N):       N                            Bolus time:  Reason IV-tPA not given: Rapidly improving neurologic deficit    ASSESSMENT/PLAN:    44y yo Female w/ PMH coming in with  Likely cardiac **STROKE CODE CONSULT NOTE**    Last known well time/Time of onset of symptoms: 6/9/20 13:00    HPI: 43 yo F with pmhx sickle cell trait, and COVID+ with symptoms beginning on March 25th (tested positive initially on April 1st, since w/ 2 rounds of Azithromycin and hydroxychloroquine of which course ended 3 weeks ago), and placed on Eliquis 5mg BID empirically after noted D-dimer elevation.   Pt had CT on April 13th showing b/l COVID-like PNA, but did not need hospitalization at the time. Since, pt has had persistent symptoms including constant right-upper chest discomfort radiating to the back. No changes with inspiration or exertion, and symptoms have improved somewhat since. However, pt is now endorsing a constant fogginess, forgetfulness and headaches with associated SOB w/ exertion & palpitations. Yesterday while going up the stairs pt again felt lightheaded, SOB, states her vision b/l went dark but she did not pass out. Today while at her office, at 1:00 pm pt started to feel lightheadedness while sitting in her chair, during which she also noted palpitations and a tingling sensation to the left side of her lower face, since resolved. Pt also noted HA at the time, states she has had many HA's since onset of Covid whereas before she never had HA's. Reports having another bout of HA's the past 3 days off and on behind her L eye.    Pt came to [facility], symptoms resolved, stroke code was called given report of L facial tingling. CTH noncontrast negative for evidence of acute infarct or acute hemorrhage.    PAST MEDICAL & SURGICAL HISTORY:      FAMILY HISTORY:      SOCIAL HISTORY:  Denies smoking, drinking, or drug use    ROS:  Constitutional: No fever, weight loss or fatigue  Eyes: No eye pain, visual disturbances, or discharge  ENMT:  No difficulty hearing, tinnitus, vertigo; No sinus or throat pain  Neck: No pain or stiffness  Respiratory: No cough, wheezing, chills or hemoptysis  Cardiovascular: No chest pain, palpitations, shortness of breath, dizziness or leg swelling  Gastrointestinal: No abdominal pain. No nausea, vomiting or hematemesis; No diarrhea or constipation. No hematochezia.  Genitourinary: No dysuria, frequency, hematuria or incontinence  Neurological: As per HPI  Skin: No itching, burning, rashes or lesions   Endocrine: No heat or cold intolerance; No hair loss  Musculoskeletal: No joint pain or swelling; No muscle, back or extremity pain  Psychiatric: No depression, anxiety, mood swings or difficulty sleeping  Heme/Lymph: No easy bruising or bleeding gums    MEDICATIONS  (STANDING):  sodium chloride 0.9% Bolus 1000 milliLiter(s) IV Bolus once    MEDICATIONS  (PRN):      Allergies    influenza virus vaccine, inactivated (Unknown)  Pineapple (Rash)    Intolerances        Vital Signs Last 24 Hrs  T(C): 36.7 (09 Jun 2020 13:45), Max: 36.7 (09 Jun 2020 13:45)  T(F): 98.1 (09 Jun 2020 13:45), Max: 98.1 (09 Jun 2020 13:45)  HR: 75 (09 Jun 2020 13:45) (75 - 75)  BP: 122/79 (09 Jun 2020 13:45) (122/79 - 122/79)  BP(mean): --  RR: 16 (09 Jun 2020 13:45) (16 - 16)  SpO2: 100% (09 Jun 2020 13:45) (100% - 100%)    PHYSICAL EXAM:  Constitutional: WDWN; NAD  Cardiovascular: RRR  Neurologic:  -Mental status: Awake, alert and oriented to person, place, and time. Speech is fluent with intact naming, repetition, and comprehension.  Recent and remote memory intact.  Attention/concentration intact.  No dysarthria, no aphasia.  Fund of knowledge appropriate.    -Cranial nerves:  II: Visual fields full to confrontation. Pupils PERRL  III, IV, VI: extraocular movements are intact without nystagmus  V: Facial sensation intact V1 through V3 intact bilaterally  VII: Face is symmetric with normal eye closure and smile, no facial droop.  VIII: hearing is intact  -Motor:  Normal bulk and tone, strength 5/5 in bilateral upper and lower extremities.   strength 5/5. No pronator drift.   -Sensation: Intact to light touch in all 4 extremities.  No neglect.   -Coordination: No dysmetria on finger-to-nose.  No clumsiness.      NIHSS: 0  ICH: n/a    Fingerstick Blood Glucose: CAPILLARY BLOOD GLUCOSE  89 (09 Jun 2020 14:21)      POCT Blood Glucose.: 89 mg/dL (09 Jun 2020 13:53)       LABS:      RADIOLOGY & ADDITIONAL STUDIES:  < from: CT Brain Stroke Protocol (06.09.20 @ 14:31) >  IMPRESSION:   No acute intracranial hemorrhage or territorial infarction.  < end of copied text >    < from: CT Angio Head w/ IV Cont (06.09.20 @ 14:32) >  IMPRESSION: No large vessel occlusion.  < end of copied text >    < from: CT Angio Head w/ IV Cont (06.09.20 @ 14:32) >  IMPRESSION: Normal CTA of the neck.  < end of copied text >    < from: CT Perfusion w/ Maps w/ IV Cont (06.09.20 @ 14:32) >  IMPRESSION: Normal CT perfusion.    < end of copied text >      IV-tPA (Y/N):       N                            Bolus time:  Reason IV-tPA not given: Rapidly improving neurologic deficit    ASSESSMENT/PLAN:    44y yo Female w/ PMHx sickle cell trait and covid +, presenting to the ED with L facial tingling, blurry vision, and lightheadedness x 5 seconds in setting of HA off and on x3 days that subsequently resolved. Pt now returned to baseline. Stroke code called on eval given report or L face tingling, and lightheadedness. CTH noncontrast negative for acute infarct or acute hemorrhage, CT Angiogram head/neck with no LVO or significant stenosis, CT Perfusion normal. no tpa given use of eliquis and rapidly improving neurologic deficits. NIHSS 0. Likely pre-syncopal event, would consider primary cardiopulmonary etiology. Consider complex migraine in the differential as well.   - recommend MRI noncontrast to rule out recent intracranial injury in setting of sickle cell trait, recent forgetfulness x2 months  - if negative, recommend Toradol 15mg, reglan 5mg to treat complex migraine  - follow up D-dimer, CRP  - further care per ED. **STROKE CODE CONSULT NOTE**    Last known well time/Time of onset of symptoms: 6/9/20 13:00    HPI: 45 yo F with pmhx sickle cell trait, and COVID+ with symptoms beginning on March 25th (tested positive initially on April 1st, since w/ 2 rounds of Azithromycin and hydroxychloroquine of which course ended 3 weeks ago), and placed on Eliquis 5mg BID empirically after noted D-dimer elevation.   Pt had CT on April 13th showing b/l COVID-like PNA, but did not need hospitalization at the time. Since, pt has had persistent symptoms including constant right-upper chest discomfort radiating to the back. No changes with inspiration or exertion, and symptoms have improved somewhat since. However, pt is now endorsing a constant fogginess, forgetfulness and headaches with associated SOB w/ exertion & palpitations. Yesterday while going up the stairs pt again felt lightheaded, SOB, states her vision b/l went dark but she did not pass out. Today while at her office, at 1:00 pm pt started to feel lightheadedness while sitting in her chair, during which she also noted palpitations and a tingling sensation to the left side of her lower face, since resolved. Pt also noted HA at the time, states she has had many HA's since onset of Covid whereas before she never had HA's. Reports having another bout of HA's the past 3 days off and on behind her L eye.    Pt came to [facility], symptoms resolved, stroke code was called given report of L facial tingling. CTH noncontrast negative for evidence of acute infarct or acute hemorrhage. CT Angio head/neck negative for LVO or significant stenosis, CT Perfusion normal. On eval, pt's symptoms have resolved NIHSS 0, 's/70's, FS 89.     PAST MEDICAL & SURGICAL HISTORY:  Sickle cell trait  COVID PNA    FAMILY HISTORY:      SOCIAL HISTORY:  Denies smoking, drinking, or drug use    ROS:  Constitutional: + fatigue  Eyes: No eye pain, visual disturbances, or discharge  ENMT:  No difficulty hearing, tinnitus, vertigo; No sinus or throat pain  Neck: No pain or stiffness  Respiratory: + cough, + SOB  Cardiovascular: + chest pain, palpitations, shortness of breath, dizziness  Gastrointestinal: No abdominal pain. No nausea, vomiting or hematemesis; No diarrhea or constipation. No hematochezia.  Genitourinary: No dysuria, frequency, hematuria or incontinence  Neurological: As per HPI  Skin: No itching, burning, rashes or lesions   Endocrine: No heat or cold intolerance; No hair loss  Musculoskeletal: No joint pain or swelling; No muscle, back or extremity pain  Psychiatric: No depression, anxiety, mood swings or difficulty sleeping  Heme/Lymph: No easy bruising or bleeding gums    MEDICATIONS  (STANDING):  sodium chloride 0.9% Bolus 1000 milliLiter(s) IV Bolus once    MEDICATIONS  (PRN):      Allergies    influenza virus vaccine, inactivated (Unknown)  Pineapple (Rash)    Intolerances        Vital Signs Last 24 Hrs  T(C): 36.7 (09 Jun 2020 13:45), Max: 36.7 (09 Jun 2020 13:45)  T(F): 98.1 (09 Jun 2020 13:45), Max: 98.1 (09 Jun 2020 13:45)  HR: 75 (09 Jun 2020 13:45) (75 - 75)  BP: 122/79 (09 Jun 2020 13:45) (122/79 - 122/79)  BP(mean): --  RR: 16 (09 Jun 2020 13:45) (16 - 16)  SpO2: 100% (09 Jun 2020 13:45) (100% - 100%)    PHYSICAL EXAM:  Constitutional: WDWN; NAD  Cardiovascular: RRR  Neurologic:  -Mental status: Awake, alert and oriented to person, place, and time. Speech is fluent with intact naming, repetition, and comprehension.  Recent and remote memory intact.  Attention/concentration intact.  No dysarthria, no aphasia.  Fund of knowledge appropriate.    -Cranial nerves:  II: Visual fields full to confrontation. Pupils PERRL  III, IV, VI: extraocular movements are intact without nystagmus  V: Facial sensation intact V1 through V3 intact bilaterally  VII: Face is symmetric with normal eye closure and smile, no facial droop.  VIII: hearing is intact  -Motor:  Normal bulk and tone, strength 5/5 in bilateral upper and lower extremities.   strength 5/5. No pronator drift.   -Sensation: Intact to light touch in all 4 extremities.  No neglect.   -Coordination: No dysmetria on finger-to-nose.  No clumsiness.      NIHSS: 0  ICH: n/a    Fingerstick Blood Glucose: CAPILLARY BLOOD GLUCOSE  89 (09 Jun 2020 14:21)      POCT Blood Glucose.: 89 mg/dL (09 Jun 2020 13:53)       LABS:      RADIOLOGY & ADDITIONAL STUDIES:  < from: CT Brain Stroke Protocol (06.09.20 @ 14:31) >  IMPRESSION:   No acute intracranial hemorrhage or territorial infarction.  < end of copied text >    < from: CT Angio Head w/ IV Cont (06.09.20 @ 14:32) >  IMPRESSION: No large vessel occlusion.  < end of copied text >    < from: CT Angio Head w/ IV Cont (06.09.20 @ 14:32) >  IMPRESSION: Normal CTA of the neck.  < end of copied text >    < from: CT Perfusion w/ Maps w/ IV Cont (06.09.20 @ 14:32) >  IMPRESSION: Normal CT perfusion.    < end of copied text >    < from: MR Head No Cont (06.09.20 @ 17:09) >  Normal noncontrast MRI of the brain.  < end of copied text >    	  IV-tPA (Y/N):       N                            Bolus time:  Reason IV-tPA not given: Rapidly improving neurologic deficit    ASSESSMENT/PLAN:    44y yo Female w/ PMHx sickle cell trait and covid +, presenting to the ED with L facial tingling, blurry vision, and lightheadedness x 5 seconds in setting of HA off and on x3 days that subsequently resolved. Pt now returned to baseline. Stroke code called on eval given report or L face tingling, and lightheadedness. CTH noncontrast negative for acute infarct or acute hemorrhage, CT Angiogram head/neck with no LVO or significant stenosis, CT Perfusion normal. no tpa given use of eliquis and rapidly improving neurologic deficits. NIHSS 0. Likely pre-syncopal event, would consider primary cardiopulmonary etiology. Consider complex migraine.   - MRI noncontrast to rule out recent intracranial injury in setting of sickle cell trait, recent forgetfulness x2 months- negative for acute infarct, grossly unremarkable  - follow up D-dimer, CRP  - pt stable from neurologic standpoint  - follow up with Neurology via telehealth appointment in 2 weeks (neurology will call patient to make appointment)  - further care per ED.

## 2020-06-09 NOTE — ED PROVIDER NOTE - CARE PROVIDER_API CALL
Pradeep Raya  INTERVENTIONAL CARDIOLOGY  [address] [place] [address]  Phone: [phone]  Fax: [phone]  Follow Up Time:     João Mendoza  NEUROLOGY  [address] [address]  Phone: [phone]  Fax: [phone]  Follow Up Time:

## 2020-06-09 NOTE — REVIEW OF SYSTEMS
[Feeling Fatigued] : feeling fatigued [Headache] : headache [Shortness Of Breath] : shortness of breath [Sore Throat] : sore throat [Dyspnea on exertion] : dyspnea during exertion [Chest Pain] : chest pain [Palpitations] : palpitations [Dizziness] : dizziness [Anxiety] : anxiety [Under Stress] : under stress [FreeTextEntry1] : On anticoagulation w Eliquis 5mg BID [Negative] : Endocrine

## 2020-06-09 NOTE — ED PROVIDER NOTE - OBJECTIVE STATEMENT
45 y/o F with PMHx of sickle cell trait, and COVID+ with symptoms beginning on March 25th (tested positive initially on April 1st, since w/ 2 rounds of Azithromycin and hydroxychloroquine of which course ended 3 weeks ago), and placed on Eliquis 5mg BID empirically after noted D-dimer elevation. Pt had CT on April 13th showing b/l COVID-like PNA, but did not need hospitalization at the time. Since, pt has had persistent symptoms including constant right-upper chest discomfort radiating to the back. No changes with inspiration or exertion, and symptoms have improved somewhat since. However, pt is now endorsing a constant fogginess, forgetfulness and headaches with associated SOB w/ exertion & palpitations. Denies any fevers, cough, nausea, vomiting or diarrhea. Today while at her office, pt had new onset episode of lightheadedness while sitting in her chair, during which she also noted palpitations and a tingling sensation to the left side of her lower face, since resolved. 45 y/o F with PMHx of sickle cell trait, and COVID+ with symptoms beginning on March 25th (tested positive initially on April 1st, since w/ 2 rounds of Azithromycin and hydroxychloroquine of which course ended 3 weeks ago), and placed on Eliquis 5mg BID empirically after noted D-dimer elevation. Pt had CT on April 13th showing b/l COVID-like PNA, but did not need hospitalization at the time. Since, pt has had persistent symptoms including constant right-upper chest discomfort radiating to the back. No changes with inspiration or exertion, and symptoms have improved somewhat since. However, pt is now endorsing a constant fogginess, forgetfulness and intermittent headaches , associated SOB w/ exertion & intermittent palpitations. no syncope or LOC Denies any fevers, cough, nausea, vomiting or diarrhea. Today while at her office, pt had new onset episode of lightheadedness while sitting in her chair, during which she also noted palpitations and a tingling sensation to the left side of her lower face, since resolved.

## 2020-06-09 NOTE — ED PROVIDER NOTE - PATIENT PORTAL LINK FT
You can access the FollowMyHealth Patient Portal offered by [facility] by registering at the following website: [email or web address]. By joining [employer]’s FollowMyHealth portal, you will also be able to view your health information using other applications (apps) compatible with our system.

## 2020-06-09 NOTE — HISTORY OF PRESENT ILLNESS
[FreeTextEntry1] : Mrs. Harrison is recovering from complicated COVID-19 pneumonia and is only back at work since yesterday after 6 weeks off. She is reporting persistent Rt sided chest pain radiating to her back and rt arm, worse with deep breaths but unchanged and steady all morning today. While at work she experienced sudden onset of rapid palpitations and felt dizzy and lightheaded. She feels her throat is sore and is recalling memory issues and confusion for the past month, even leaving stove on while at home.  She looks very anxious and is tachypneic at this time.

## 2020-06-09 NOTE — ED PROVIDER NOTE - CLINICAL SUMMARY MEDICAL DECISION MAKING FREE TEXT BOX
43 y/o with prior COVID infection and persistent symptoms including constant right chest discomfort, non-exertional, exertional SOB, and intermittent episodes of palpitations, lightheadedness and reported foggy thinking. Today with new possible neuro deficit during episode of palpitations, reporting left facial numbness which has since resolved. Episode at 1PM, stroke code called. No apparent stroke on CT. CT PE protocol: no apparent PE. EKG with no signs of ACS and initial trop negative. No apparent dysrhythmia to explain event, no metabolic abnormalities identified. Seen by neuro stroke team, advised MR brain for further evaluation for COVID-related brain abnormalities. Plan to further evaluate for ACS for second trop. If negative and MR reassuring, will d/c with advised neurology and cardiology f/u.

## 2020-06-09 NOTE — PHYSICAL EXAM
[General Appearance - Well Developed] : well developed [Normal Oral Mucosa] : normal oral mucosa [No Oral Cyanosis] : no oral cyanosis [No Oral Pallor] : no oral pallor [Respiration, Rhythm And Depth] : normal respiratory rhythm and effort [Exaggerated Use Of Accessory Muscles For Inspiration] : no accessory muscle use [Auscultation Breath Sounds / Voice Sounds] : lungs were clear to auscultation bilaterally [Murmurs] : no murmurs present [Heart Sounds] : normal S1 and S2 [Abdomen Soft] : soft [Abdomen Tenderness] : non-tender [Abnormal Walk] : normal gait [Abdomen Mass (___ Cm)] : no abdominal mass palpated [Gait - Sufficient For Exercise Testing] : the gait was sufficient for exercise testing [Nail Clubbing] : no clubbing of the fingernails [Cyanosis, Localized] : no localized cyanosis [Skin Color & Pigmentation] : normal skin color and pigmentation [] : no rash [FreeTextEntry1] : Anxious, AAOx3 Electrodesiccation And Curettage Text: The wound bed was treated with electrodesiccation and curettage after the biopsy was performed.

## 2020-06-09 NOTE — REASON FOR VISIT
[FreeTextEntry1] : [place] (6/8/2020); Sudden palpitations while at work associated with dizzy spell and near syncope

## 2020-06-10 LAB
T3/T3 UPTAKE INDEX SERPL-RTO: 35 % — SIGNIFICANT CHANGE UP (ref 28–41)
T4/T3 UPTAKE INDEX SERPL: 1.1 TBI — SIGNIFICANT CHANGE UP (ref 0.8–1.3)

## 2020-06-11 LAB
CULTURE RESULTS: NO GROWTH — SIGNIFICANT CHANGE UP
SPECIMEN SOURCE: SIGNIFICANT CHANGE UP

## 2020-06-13 DIAGNOSIS — Z88.8 ALLERGY STATUS TO OTHER DRUGS, MEDICAMENTS AND BIOLOGICAL SUBSTANCES STATUS: ICD-10-CM

## 2020-06-13 DIAGNOSIS — R55 SYNCOPE AND COLLAPSE: ICD-10-CM

## 2020-06-13 DIAGNOSIS — Z91.018 ALLERGY TO OTHER FOODS: ICD-10-CM

## 2020-06-13 DIAGNOSIS — R42 DIZZINESS AND GIDDINESS: ICD-10-CM

## 2020-06-13 DIAGNOSIS — U07.1 COVID-19: ICD-10-CM

## 2020-06-17 PROBLEM — D57.3 SICKLE-CELL TRAIT: Chronic | Status: ACTIVE | Noted: 2020-06-09

## 2020-06-22 ENCOUNTER — APPOINTMENT (OUTPATIENT)
Dept: HEART AND VASCULAR | Facility: CLINIC | Age: 45
End: 2020-06-22
Payer: COMMERCIAL

## 2020-06-22 PROCEDURE — 99443: CPT

## 2020-06-24 ENCOUNTER — APPOINTMENT (OUTPATIENT)
Dept: NEUROLOGY | Facility: CLINIC | Age: 45
End: 2020-06-24
Payer: COMMERCIAL

## 2020-06-24 VITALS
HEIGHT: 66 IN | WEIGHT: 192 LBS | SYSTOLIC BLOOD PRESSURE: 125 MMHG | HEART RATE: 71 BPM | TEMPERATURE: 98.4 F | BODY MASS INDEX: 30.86 KG/M2 | OXYGEN SATURATION: 99 % | DIASTOLIC BLOOD PRESSURE: 77 MMHG

## 2020-06-24 DIAGNOSIS — G47.9 SLEEP DISORDER, UNSPECIFIED: ICD-10-CM

## 2020-06-24 DIAGNOSIS — R55 SYNCOPE AND COLLAPSE: ICD-10-CM

## 2020-06-24 PROCEDURE — 99214 OFFICE O/P EST MOD 30 MIN: CPT

## 2020-06-24 RX ORDER — CYCLOBENZAPRINE HYDROCHLORIDE 5 MG/1
5 TABLET, FILM COATED ORAL 3 TIMES DAILY
Qty: 90 | Refills: 0 | Status: DISCONTINUED | COMMUNITY
Start: 2018-09-10 | End: 2020-06-24

## 2020-06-25 PROBLEM — G47.9 SLEEP DISTURBANCE: Status: ACTIVE | Noted: 2020-06-25

## 2020-06-25 PROBLEM — R55 SYNCOPE: Status: ACTIVE | Noted: 2020-06-24

## 2020-06-25 PROBLEM — R55 NEAR SYNCOPE: Status: ACTIVE | Noted: 2020-06-09

## 2020-06-25 NOTE — ASSESSMENT
[FreeTextEntry1] : 45 year old right handed female patient presents for a post hospital follow up visit from [facility]. Patient was discharged on June 9, 2020 with diagnosis of near syncope. Neurological examination shows slight imbalance to the right when ambulating.\par \par I discussed this case with Dr. Mendoza and Dr. Mcclain.\par \par Patient to continue with COVID inflammatory marker labs as ordered by Dr. Raya.\par Routine EEG and 48 hour EEG to rule out seizures vs. syncope\par Vestibular therapy\par Discontinue Clonazepam PRN\par Start mirtazapine 7.5 mg for insomnia and mood\par Keep headache diary; may take tylenol PRN but if worsening, may need alternative therapy\par Neuropsych testing for memory \par \par f/u telehealth after testings done w/Dr. Mendoza \par

## 2020-06-25 NOTE — PHYSICAL EXAM
[FreeTextEntry1] : Constitutional: alert, in no acute distress, well nourished and well developed.\par Psychiatric: insight and judgment were intact.\par \par Neurologic: \par Mental Status: The patient is alert, attentive, and oriented to person, place, and time. Speech is clear and fluent with good repetition, comprehension, and naming.  \par Cranial nerves:\par CN II: Visual fields are full to confrontation. Visual acuity is intact. \par CN III, IV, VI: EOMI, PERRLA\par CN V: Facial sensation is intact to pinprick in all 3 divisions bilaterally. \par CN VII: Face is symmetric with normal eye closure and smile.\par CN VIII: Hearing is normal to rubbing fingers\par CN IX, X: Palate elevates symmetrically. Phonation is normal.\par CN XI: Head turning and shoulder shrug are intact and symmetric.\par CN XII: Tongue is midline with normal movements and no atrophy and no deviation with protrusion.\par Motor: There is no pronator drift of out-stretched arms. Muscle bulk and tone is normal. Strength is full bilaterally 5/5 on both UE and both LE. \par Sensation: light touch is intact\par Reflexes:Reflexes are 2+ and symmetric at the biceps, triceps, knees, and ankles.\par Coordination: Rapid alternating movements and fine finger movements are intact. There is no dysmetria on finger-to-nose and heel-knee-shin. There are no abnormal or extraneous movements. Negative Romberg. \par Gait/Stance: Posture is normal. Gait is steady but is a little off balance to the right. \par \par \par \par
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
## 2020-07-06 ENCOUNTER — APPOINTMENT (OUTPATIENT)
Dept: HEART AND VASCULAR | Facility: CLINIC | Age: 45
End: 2020-07-06
Payer: COMMERCIAL

## 2020-07-06 DIAGNOSIS — G47.00 INSOMNIA, UNSPECIFIED: ICD-10-CM

## 2020-07-06 PROCEDURE — 99443: CPT

## 2020-07-06 NOTE — HISTORY OF PRESENT ILLNESS
[Medical Office: ([facility])___] : at the medical office located in  [Home] : at home, [unfilled] , at the time of the visit. [Verbal consent obtained from patient] : the patient, [unfilled] [FreeTextEntry1] : This is scheduled 2 week follow up. 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 Palpitations are lessening and reasonably controlled with Propranolol 20mg BID which she continues. She remains on Eliquis 5mg BID at this time. Her walking seems to be stable while at home but somewhat misbalanced if walking longer or outside when she feels she leans to the right and sways on the side. Her near vision worsened significantly and she will be seeing ophthalmologist locally. She is s/p visit with neurologist ad was given referral for vestibular testing scheduled for late July. \par Although Clonazepam 0.5mg helps with sleep when was given Mirtazapine at 7.5mg to help with anxiety and insomnia which she is still to start, however fears worsening dizziness. \par Her energy is better, however not quite back to her baseline experiencing intermittent exhaustion and still some brain fog and memory issues, although getting better.\par She will be seeing DR. Lea for pulmonary consultation post COVID PNA and was given a phone to join [facility] study on chronic covid patients which she enrolled with and expect to start at the end of July. \par \par Plan:\par 1. POSTVIRAL SYNDROME: Still mildly elevated inflammatory markers: ferritin and D dimer, with normalized CRP. Taper down A/C to Eliquis once daily x 10 days and then D/C. To repeat D dimer in a week to assure declining levels continue. \par 2. RESIDUAL POST COVID SYMPTOMS: F/U for vertigo testing with Dr Stevens in late July. All brain imaging was unremarkable during [facility] ER visit in early June. Memory testing and EEG monitor are ordered; F/U by neurology. \par 3. DYSPNEA/PLEURITIC PAIN: Referral given for pulmonary consult with Dr. Martha Lea regarding prolonged dyspnea and pleurisy with small residual RT upper lung infiltrate. \par 3. PALPITATIONS/CP: These are post-viral and non-cardiac. Normal ECHO 3 weeks ago with no evidence of Troponin elevation. Outpatient EKG was unremarkable and c/w sinus tach, continue Propranolol 20mg BID which she tolerates well. Her Heart monitor x 72 hrs 10 days ago revealed few episodes of sinus tachycardia in low 100s and no other arrhythmia; she was reassured.\par 4. SCHEDULE FOLLOW UP: Telephonic F/U in a week with estimated return to work by 8/1/2020. Order D dimer and ferritin levels in a week. Send the form to HR regarding her progress and estimated time to return to work. [Time Spent: ___ minutes] : I have spent [unfilled] minutes with the patient on the telephone

## 2020-07-07 ENCOUNTER — APPOINTMENT (OUTPATIENT)
Dept: PULMONOLOGY | Facility: CLINIC | Age: 45
End: 2020-07-07
Payer: COMMERCIAL

## 2020-07-07 VITALS
HEIGHT: 66 IN | HEART RATE: 68 BPM | SYSTOLIC BLOOD PRESSURE: 138 MMHG | WEIGHT: 192 LBS | BODY MASS INDEX: 30.86 KG/M2 | DIASTOLIC BLOOD PRESSURE: 77 MMHG | OXYGEN SATURATION: 100 % | TEMPERATURE: 98.1 F

## 2020-07-07 PROCEDURE — 99203 OFFICE O/P NEW LOW 30 MIN: CPT

## 2020-07-07 NOTE — CONSULT LETTER
[Dear  ___] : Dear  [unfilled], [Consult Letter:] : I had the pleasure of evaluating your patient, [unfilled]. [Please see my note below.] : Please see my note below. [Consult Closing:] : Thank you very much for allowing me to participate in the care of this patient.  If you have any questions, please do not hesitate to contact me. [Sincerely,] : Sincerely, [FreeTextEntry3] : Martha Lea MD\par \par [employer] [facility]\par Pulmonary, Critical Care, and Sleep Medicine\par

## 2020-07-07 NOTE — ASSESSMENT
[FreeTextEntry1] : 46yo woman, referred by Dr. Raya, for evaluation of post COVID dyspnea. Appropriate resting and ambulatory oxygenation. Imaging reviewed: resolving bilateral ground glass opacities with no evidence of fibrosis. Dyspnea, gait instability, fatigue are all commonly described post COVID19 sequelae. These are expected to resolve especially since she has no red flags of persistent hypoxemia and worsening/nonresolving imaging. Will proceed with full PFTs and 6MWT for an objective lung function assessment. Encouraged exercise. Support provided. All questions answered. Follow up within 4 weeks, and after the PFTs are performed.

## 2020-07-07 NOTE — HISTORY OF PRESENT ILLNESS
[Former] : former [< 30 pack-years] : < 30 pack-years [Never] : never [TextBox_4] : 44yo with no prior medical issues referred for evaluation of post COVID dyspnea. She initially got sick with COVID in March: anosmia, fever, myalgias. Initially just quarantined but then got worse so got a 14 day course of Plaquenil and Azithromycin; felt better but then had a rebound in symptoms and got another 14 day course of Abx. Most symptoms are resolved but still has chest pain, dyspnea on exertion and now gait instability and dizziness. Able to walk a decent amount but is limited by neurologic symptoms; workup negative to date. Has been on Eliquis for high D Dimers. Had two CTA, both negative for PE.

## 2020-07-12 LAB
BASOPHILS # BLD AUTO: 0.02 K/UL
BASOPHILS NFR BLD AUTO: 0.3 %
CRP SERPL-MCNC: 0.22 MG/DL
CRP SERPL-MCNC: 0.35 MG/DL
DEPRECATED D DIMER PPP IA-ACNC: 278 NG/ML DDU
DEPRECATED D DIMER PPP IA-ACNC: 296 NG/ML DDU
EOSINOPHIL # BLD AUTO: 0.06 K/UL
EOSINOPHIL NFR BLD AUTO: 1 %
FERRITIN SERPL-MCNC: 203 NG/ML
FERRITIN SERPL-MCNC: 223 NG/ML
HCT VFR BLD CALC: 38 %
HETEROPH AB SER QL: NEGATIVE
HGB BLD-MCNC: 12.3 G/DL
IMM GRANULOCYTES NFR BLD AUTO: 0.5 %
LYMPHOCYTES # BLD AUTO: 1.95 K/UL
LYMPHOCYTES NFR BLD AUTO: 31.4 %
MAN DIFF?: NORMAL
MCHC RBC-ENTMCNC: 29.6 PG
MCHC RBC-ENTMCNC: 32.4 GM/DL
MCV RBC AUTO: 91.3 FL
MONOCYTES # BLD AUTO: 0.55 K/UL
MONOCYTES NFR BLD AUTO: 8.9 %
NEUTROPHILS # BLD AUTO: 3.6 K/UL
NEUTROPHILS NFR BLD AUTO: 57.9 %
PLATELET # BLD AUTO: 170 K/UL
RBC # BLD: 4.16 M/UL
RBC # FLD: 13 %
WBC # FLD AUTO: 6.21 K/UL

## 2020-07-18 ENCOUNTER — LABORATORY RESULT (OUTPATIENT)
Age: 45
End: 2020-07-18

## 2020-07-19 LAB — DEPRECATED D DIMER PPP IA-ACNC: 265 NG/ML DDU

## 2020-07-20 LAB
AT III PPP CHRO-ACNC: 105 %
B2 GLYCOPROT1 AB SER QL: NEGATIVE
CARDIOLIPIN AB SER IA-ACNC: NEGATIVE

## 2020-07-21 ENCOUNTER — APPOINTMENT (OUTPATIENT)
Dept: PULMONOLOGY | Facility: CLINIC | Age: 45
End: 2020-07-21
Payer: COMMERCIAL

## 2020-07-21 ENCOUNTER — APPOINTMENT (OUTPATIENT)
Dept: PULMONOLOGY | Facility: CLINIC | Age: 45
End: 2020-07-21

## 2020-07-21 LAB
ANA SER IF-ACNC: NEGATIVE
ANACR T: NEGATIVE

## 2020-07-21 PROCEDURE — 94618 PULMONARY STRESS TESTING: CPT

## 2020-07-21 PROCEDURE — 94060 EVALUATION OF WHEEZING: CPT

## 2020-07-21 PROCEDURE — 94729 DIFFUSING CAPACITY: CPT

## 2020-07-21 PROCEDURE — 94726 PLETHYSMOGRAPHY LUNG VOLUMES: CPT

## 2020-07-22 LAB
B2 GLYCOPROT1 IGA SERPL IA-ACNC: <5 SAU
B2 GLYCOPROT1 IGG SER-ACNC: <5 SGU
B2 GLYCOPROT1 IGM SER-ACNC: <5 SMU
CARDIOLIPIN IGM SER-MCNC: 7.9 MPL
CARDIOLIPIN IGM SER-MCNC: <5 GPL

## 2020-07-23 ENCOUNTER — TRANSCRIPTION ENCOUNTER (OUTPATIENT)
Age: 45
End: 2020-07-23

## 2020-07-29 ENCOUNTER — APPOINTMENT (OUTPATIENT)
Dept: NEUROLOGY | Facility: CLINIC | Age: 45
End: 2020-07-29
Payer: COMMERCIAL

## 2020-07-29 PROCEDURE — 95816 EEG AWAKE AND DROWSY: CPT

## 2020-07-30 ENCOUNTER — APPOINTMENT (OUTPATIENT)
Dept: PULMONOLOGY | Facility: CLINIC | Age: 45
End: 2020-07-30
Payer: COMMERCIAL

## 2020-07-30 PROCEDURE — 99443: CPT

## 2020-07-30 PROCEDURE — 95708 EEG WO VID EA 12-26HR UNMNTR: CPT

## 2020-07-30 NOTE — PROCEDURE
[FreeTextEntry1] : PFTs from 7/21/2020 reviewed:\par FEV1 77\par FEV1/FVC 67\par TLC 78\par RV 42\par DLCO 75\par \par 6MWT from 7/21/2020 reviewed:\par pre/post HR 72/101\par pre/post saturation 98/98%\par pre/post Aureliano Dyspnea 2/?\par meters walked 97.6; test stopped after 2 min 20 sec because of LUO\par \par Mild obstruction, mild restriction, normal DLCO

## 2020-07-30 NOTE — ASSESSMENT
[FreeTextEntry1] : 44yo with post COVID dyspnea. Dyspnea is persistent but not worse. PFTs show mild obstruction, mild restriction, and normal DLCO. Could not tolerate the 6MWT because of dyspnea but no desaturation noted. Has significant improvement in symptoms with PRN CATA use. Will trial ICS; Qvar sent to pharmacy on file. Instructed to rinse/gargle after every use to decrease vocal cord irritation. Support provided. Dyspnea is expected to resolve with type. Encouraged continued ambulation. Follow up in 4 weeks.

## 2020-07-30 NOTE — HISTORY OF PRESENT ILLNESS
[Home] : at home, [unfilled] , at the time of the visit. [Medical Office: ([facility])___] : at the medical office located in  [Verbal consent obtained from patient] : the patient, [unfilled] [< 30 pack-years] : < 30 pack-years [Former] : former [Never] : never [TextBox_4] : 44yo with no prior medical issues referred for evaluation of post COVID dyspnea. She initially got sick with COVID in March: anosmia, fever, myalgias. Initially just quarantined but then got worse so got a 14 day course of Plaquenil and Azithromycin; felt better but then had a rebound in symptoms and got another 14 day course of Abx. Most symptoms are resolved but still has chest pain, dyspnea on exertion and now gait instability and dizziness. Able to walk a decent amount but is limited by neurologic symptoms; workup negative to date. Has been on Eliquis for high D Dimers. Had two CTA, both negative for PE.\par \par 7/30/2020\par Still has dyspnea. Pushing herself to walk more. Tried albuterol and feels much better after use. No other changes.

## 2020-07-31 ENCOUNTER — APPOINTMENT (OUTPATIENT)
Dept: HEART AND VASCULAR | Facility: CLINIC | Age: 45
End: 2020-07-31
Payer: COMMERCIAL

## 2020-07-31 ENCOUNTER — APPOINTMENT (OUTPATIENT)
Dept: NEUROLOGY | Facility: CLINIC | Age: 45
End: 2020-07-31

## 2020-07-31 DIAGNOSIS — M79.606 PAIN IN LEG, UNSPECIFIED: ICD-10-CM

## 2020-07-31 PROCEDURE — 95708 EEG WO VID EA 12-26HR UNMNTR: CPT

## 2020-07-31 PROCEDURE — 95700 EEG CONT REC W/VID EEG TECH: CPT

## 2020-07-31 PROCEDURE — 95721 EEG PHY/QHP>36<60 HR W/O VID: CPT

## 2020-07-31 PROCEDURE — 99443: CPT

## 2020-08-07 DIAGNOSIS — M21.371 FOOT DROP, RIGHT FOOT: ICD-10-CM

## 2020-08-07 DIAGNOSIS — R53.1 WEAKNESS: ICD-10-CM

## 2020-08-10 ENCOUNTER — APPOINTMENT (OUTPATIENT)
Dept: NEUROLOGY | Facility: CLINIC | Age: 45
End: 2020-08-10
Payer: COMMERCIAL

## 2020-08-10 PROCEDURE — 99214 OFFICE O/P EST MOD 30 MIN: CPT | Mod: 95

## 2020-08-10 RX ORDER — CLONAZEPAM 0.5 MG/1
0.5 TABLET ORAL
Qty: 60 | Refills: 0 | Status: DISCONTINUED | COMMUNITY
Start: 2020-06-09 | End: 2020-08-10

## 2020-08-11 NOTE — HISTORY OF PRESENT ILLNESS
[Home] : at home, [unfilled] , at the time of the visit. [Verbal consent obtained from patient] : the patient, [unfilled] [Medical Office: ([facility])___] : at the medical office located in  [FreeTextEntry1] : Patient reports says she still has weakness but not as bas it was before. She saw there was mild obstruction by the pulmonologist and noted that her right sided weakness has improved. She notes that she still has trouble opening bottles; but says her right leg is still weak but not as bad as last spoken on the phone. She was referred by Dr. Raya for MRI of spine to see if there's inflammation. \par \par Has been doing VT= twice a week and says there's improvement. \par Tolerating mirtazapine 7.5mg for insomnia/mood. Says she's sleeping better.\par \par She says had some headaches but only when she was on her period. Takes Advil- she says she knows about Eliquis and contraindication.\par Memory has improved- but still has some issues; still writes things down. She says she has not scheduled a neuropsych testing. \par \par She describes her walking like a 90 year old and does not have dizzy spells.

## 2020-08-11 NOTE — ASSESSMENT
[FreeTextEntry1] : Reviewed EEG- no seizures\par Continue mirtazapine for insomnia and mood\par Continue vestibular therapy\par \par MRI to r/o stroke due to right leg weakness; however, patient is aware to make an in-person clinic visit with attending for further evaluation and that telephone visit is not sufficient for evaluation\par \par Advised to make neuropsych testing for evaluation memory\par \par

## 2020-08-14 ENCOUNTER — LABORATORY RESULT (OUTPATIENT)
Age: 45
End: 2020-08-14

## 2020-08-15 LAB
ALBUMIN SERPL ELPH-MCNC: 4.9 G/DL
ALP BLD-CCNC: 48 U/L
ALT SERPL-CCNC: 19 U/L
ANION GAP SERPL CALC-SCNC: 15 MMOL/L
AST SERPL-CCNC: 15 U/L
BASOPHILS # BLD AUTO: 0.02 K/UL
BASOPHILS NFR BLD AUTO: 0.3 %
BILIRUB SERPL-MCNC: 0.3 MG/DL
BUN SERPL-MCNC: 12 MG/DL
CALCIUM SERPL-MCNC: 9.3 MG/DL
CHLORIDE SERPL-SCNC: 101 MMOL/L
CO2 SERPL-SCNC: 24 MMOL/L
CREAT SERPL-MCNC: 0.66 MG/DL
CRP SERPL-MCNC: 0.54 MG/DL
DEPRECATED D DIMER PPP IA-ACNC: 361 NG/ML DDU
EOSINOPHIL # BLD AUTO: 0.04 K/UL
EOSINOPHIL NFR BLD AUTO: 0.5 %
ERYTHROCYTE [SEDIMENTATION RATE] IN BLOOD BY WESTERGREN METHOD: 13 MM/HR
FERRITIN SERPL-MCNC: 196 NG/ML
GLUCOSE SERPL-MCNC: 87 MG/DL
HCT VFR BLD CALC: 41.7 %
HGB BLD-MCNC: 13.5 G/DL
IMM GRANULOCYTES NFR BLD AUTO: 0.3 %
LYMPHOCYTES # BLD AUTO: 2.86 K/UL
LYMPHOCYTES NFR BLD AUTO: 37.7 %
MAN DIFF?: NORMAL
MCHC RBC-ENTMCNC: 31.1 PG
MCHC RBC-ENTMCNC: 32.4 GM/DL
MCV RBC AUTO: 96.1 FL
MONOCYTES # BLD AUTO: 0.52 K/UL
MONOCYTES NFR BLD AUTO: 6.9 %
NEUTROPHILS # BLD AUTO: 4.13 K/UL
NEUTROPHILS NFR BLD AUTO: 54.3 %
PLATELET # BLD AUTO: 178 K/UL
POTASSIUM SERPL-SCNC: 3.9 MMOL/L
PROT SERPL-MCNC: 7.6 G/DL
RBC # BLD: 4.34 M/UL
RBC # FLD: 12.9 %
SODIUM SERPL-SCNC: 140 MMOL/L
WBC # FLD AUTO: 7.59 K/UL

## 2020-08-16 LAB
ALBUMIN MFR SERPL ELPH: 59.7 %
ALBUMIN SERPL-MCNC: 4.5 G/DL
ALBUMIN/GLOB SERPL: 1.5 RATIO
ALPHA1 GLOB MFR SERPL ELPH: 3.6 %
ALPHA1 GLOB SERPL ELPH-MCNC: 0.3 G/DL
ALPHA2 GLOB MFR SERPL ELPH: 7.3 %
ALPHA2 GLOB SERPL ELPH-MCNC: 0.6 G/DL
B-GLOBULIN MFR SERPL ELPH: 11.1 %
B-GLOBULIN SERPL ELPH-MCNC: 0.8 G/DL
GAMMA GLOB FLD ELPH-MCNC: 1.4 G/DL
GAMMA GLOB MFR SERPL ELPH: 18.3 %
INTERPRETATION SERPL IEP-IMP: NORMAL
PROT SERPL-MCNC: 7.6 G/DL
PROT SERPL-MCNC: 7.6 G/DL

## 2020-08-19 ENCOUNTER — APPOINTMENT (OUTPATIENT)
Dept: OTOLARYNGOLOGY | Facility: CLINIC | Age: 45
End: 2020-08-19
Payer: COMMERCIAL

## 2020-08-19 VITALS
DIASTOLIC BLOOD PRESSURE: 79 MMHG | TEMPERATURE: 98.3 F | SYSTOLIC BLOOD PRESSURE: 132 MMHG | OXYGEN SATURATION: 96 % | WEIGHT: 192 LBS | HEIGHT: 66 IN | HEART RATE: 57 BPM | BODY MASS INDEX: 30.86 KG/M2

## 2020-08-19 DIAGNOSIS — R68.89 OTHER GENERAL SYMPTOMS AND SIGNS: ICD-10-CM

## 2020-08-19 DIAGNOSIS — R07.0 PAIN IN THROAT: ICD-10-CM

## 2020-08-19 DIAGNOSIS — M26.609 UNSPECIFIED TEMPOROMANDIBULAR JOINT DISORDER: ICD-10-CM

## 2020-08-19 DIAGNOSIS — R68.84 JAW PAIN: ICD-10-CM

## 2020-08-19 DIAGNOSIS — R09.89 OTHER SPECIFIED SYMPTOMS AND SIGNS INVOLVING THE CIRCULATORY AND RESPIRATORY SYSTEMS: ICD-10-CM

## 2020-08-19 PROCEDURE — 31575 DIAGNOSTIC LARYNGOSCOPY: CPT

## 2020-08-19 PROCEDURE — 99204 OFFICE O/P NEW MOD 45 MIN: CPT | Mod: 25

## 2020-08-23 LAB — IL6 SERPL-MCNC: 3 PG/ML

## 2020-08-26 LAB — A-TUMOR NECROSIS FACT SERPL-MCNC: 0.8 PG/ML

## 2020-08-26 NOTE — PROCEDURE
[Congested] : congested [Image(s) Captured] : image(s) captured and filed [Topical Lidocaine] : topical lidocaine [Flexible Endoscope] : examined with the flexible endoscope [Serial Number: ___] : Serial Number: [unfilled] [Adenoids Hypertrophy] : hypertrophied bilaterally [Adenoids Swelling Bilateral] : swollen bilaterally [Adenoids Inflammation Bilateral] : inflamed bilaterally [de-identified] :  Deviated Nasal Septum to the right side.  Turbinate Hypertroph, Lingual Tonsil Hypertrophy,

## 2020-08-26 NOTE — CONSULT LETTER
[Consult Letter:] : I had the pleasure of evaluating your patient, [unfilled]. [Dear  ___] : Dear  [unfilled], [Please see my note below.] : Please see my note below. [Sincerely,] : Sincerely, [Consult Closing:] : Thank you very much for allowing me to participate in the care of this patient.  If you have any questions, please do not hesitate to contact me. [FreeTextEntry3] : Daryn Pitts MD, FACS\par Professor of Otolaryngology, [employer] [facility] [employer] at [facility]\par Director, [facility], Department of Otolaryngology, [facility]\par , Head & Neck Service Line, [facility]\par

## 2020-08-26 NOTE — HISTORY OF PRESENT ILLNESS
[de-identified] : 45 years old female patient with history of Left ear pain, Jaw pain, globus sensation since 7/28/2020.  Patient is present today in the office with Deviated Nasal Septum to the right side.  Turbinate Hypertroph, Lingual Tonsil Hypertrophy,  GERD,  Adenoid Hypertrophy.  Patient C/O snoring Gasping for air during sleep.  Awakening with a dry mouth.  •Loud snoring.  Episodes in which her stop breathing during sleep.  Which would be reported by another person\par \par

## 2020-08-26 NOTE — REASON FOR VISIT
[Initial Consultation] : an initial consultation for [FreeTextEntry2] : Left ear pain, Jaw pain, globus sensation since 7/28/2020.  Patient states her level of severity is a level 10 out of 10 and it occurs constant.  Patient states nothing helps to improve or worsens her Left ear pain, Jaw pain, globus sensation since 7/28/2020

## 2020-08-26 NOTE — PHYSICAL EXAM
[Normal] : tympanic membranes are normal in both ears [] : septum deviated bilaterally [de-identified] :  Deviated Nasal Septum to the right side.  Turbinate Hypertroph, Lingual Tonsil Hypertrophy,

## 2020-08-31 ENCOUNTER — OUTPATIENT (OUTPATIENT)
Dept: OUTPATIENT SERVICES | Facility: HOSPITAL | Age: 45
LOS: 1 days | End: 2020-08-31
Payer: COMMERCIAL

## 2020-08-31 ENCOUNTER — APPOINTMENT (OUTPATIENT)
Dept: SLEEP CENTER | Facility: HOME HEALTH | Age: 45
End: 2020-08-31
Payer: COMMERCIAL

## 2020-08-31 PROCEDURE — 95800 SLP STDY UNATTENDED: CPT

## 2020-08-31 PROCEDURE — 95800 SLP STDY UNATTENDED: CPT | Mod: 26

## 2020-09-02 DIAGNOSIS — G47.33 OBSTRUCTIVE SLEEP APNEA (ADULT) (PEDIATRIC): ICD-10-CM

## 2020-09-10 ENCOUNTER — FORM ENCOUNTER (OUTPATIENT)
Age: 45
End: 2020-09-10

## 2020-09-16 ENCOUNTER — APPOINTMENT (OUTPATIENT)
Dept: HEART AND VASCULAR | Facility: CLINIC | Age: 45
End: 2020-09-16
Payer: COMMERCIAL

## 2020-09-16 DIAGNOSIS — R50.9 FEVER, UNSPECIFIED: ICD-10-CM

## 2020-09-16 PROCEDURE — 99443: CPT

## 2020-09-17 ENCOUNTER — LABORATORY RESULT (OUTPATIENT)
Age: 45
End: 2020-09-17

## 2020-09-17 ENCOUNTER — APPOINTMENT (OUTPATIENT)
Dept: NEUROLOGY | Facility: CLINIC | Age: 45
End: 2020-09-17
Payer: COMMERCIAL

## 2020-09-17 VITALS
WEIGHT: 201 LBS | DIASTOLIC BLOOD PRESSURE: 82 MMHG | TEMPERATURE: 98.3 F | HEIGHT: 66 IN | SYSTOLIC BLOOD PRESSURE: 127 MMHG | BODY MASS INDEX: 32.3 KG/M2 | OXYGEN SATURATION: 100 % | HEART RATE: 72 BPM

## 2020-09-17 LAB
CRP SERPL-MCNC: 0.49 MG/DL
DEPRECATED D DIMER PPP IA-ACNC: 485 NG/ML DDU
FERRITIN SERPL-MCNC: 193 NG/ML
SARS-COV-2 IGG SERPL IA-ACNC: 1.87 INDEX
SARS-COV-2 IGG SERPL QL IA: POSITIVE

## 2020-09-17 PROCEDURE — 99214 OFFICE O/P EST MOD 30 MIN: CPT

## 2020-09-17 RX ORDER — MIRTAZAPINE 7.5 MG/1
7.5 TABLET, FILM COATED ORAL
Qty: 30 | Refills: 1 | Status: DISCONTINUED | COMMUNITY
Start: 2020-06-24 | End: 2020-09-17

## 2020-09-17 RX ORDER — SERTRALINE HYDROCHLORIDE 50 MG/1
50 TABLET, FILM COATED ORAL
Qty: 90 | Refills: 3 | Status: DISCONTINUED | COMMUNITY
Start: 2020-08-12 | End: 2020-09-17

## 2020-09-17 NOTE — ASSESSMENT
[FreeTextEntry1] : Patient appears to be making progress but still has lingering COVID effects:\par \par Plan for covid encephalopathy: \par -antiphospholipid/ lupus anticog labs- see if it's negative \par -still would not reduce her eliquis- but if her goal is to be off anticoag, then consider xarelto \par \par -to restart her on effexor 37.5mg for two weeks and then 75mg afterwards and memantine 5mg; may add modafanil if needed

## 2020-09-17 NOTE — PHYSICAL EXAM
[FreeTextEntry1] : General: sitting on exam table comfortably, NAD\par CV: RRR\par Extremities: FROMx4, 2+ radial pulses b/l\par \par Neurological Exam:\par Mental Status: AA&O x3, fluent, no dysarthria, follows all commands, able to tell full history, attention intact\par Cranial nerves: EOMI, facial sensation intact, no facial droop, tongue midline\par Motor: No pronator drift, 5/5 x4, normal bulk and tone\par Sensory: Intact light touch bilaterally\par Reflexes: UE 2+, LE  2+\par Cerebellar: FNF intact b/l\par Gait: steady\par

## 2020-09-18 LAB — LUPUS ANTICOAGULANT CASCADE REFLEX: NORMAL

## 2020-09-21 LAB
CARDIOLIPIN AB SER IA-ACNC: NEGATIVE
CARDIOLIPIN IGM SER-MCNC: 9.5 MPL
CARDIOLIPIN IGM SER-MCNC: <5 GPL
DEPRECATED CARDIOLIPIN IGA SER: <5 APL

## 2020-10-01 ENCOUNTER — APPOINTMENT (OUTPATIENT)
Dept: HEART AND VASCULAR | Facility: CLINIC | Age: 45
End: 2020-10-01
Payer: COMMERCIAL

## 2020-10-01 DIAGNOSIS — D57.3 SICKLE-CELL TRAIT: ICD-10-CM

## 2020-10-01 PROCEDURE — 99214 OFFICE O/P EST MOD 30 MIN: CPT | Mod: 95

## 2020-10-02 PROBLEM — R50.9 FEVER OF UNKNOWN ORIGIN (FUO): Status: ACTIVE | Noted: 2020-10-02

## 2020-10-02 RX ORDER — PROPRANOLOL HYDROCHLORIDE 20 MG/1
20 TABLET ORAL TWICE DAILY
Qty: 60 | Refills: 6 | Status: COMPLETED | COMMUNITY
Start: 2020-06-22 | End: 2020-10-02

## 2020-10-02 RX ORDER — METHYLPREDNISOLONE 4 MG/1
4 TABLET ORAL
Qty: 1 | Refills: 1 | Status: COMPLETED | COMMUNITY
Start: 2020-08-12 | End: 2020-08-18

## 2020-10-02 RX ORDER — AZITHROMYCIN 500 MG/1
500 TABLET, FILM COATED ORAL
Qty: 7 | Refills: 1 | Status: DISCONTINUED | COMMUNITY
Start: 2020-09-08 | End: 2020-10-02

## 2020-10-02 NOTE — REASON FOR VISIT
[Follow-Up - Clinic] : a clinic follow-up of [FreeTextEntry2] : to evalauate for disability timeline and return to work [FreeTextEntry1] : The visit was provided via telehealth using real-time 2 way audio visual technology via [employer] platform as she failed to connect via [employer] due to the firewall issues and poor WiFi. The patient Rainer Harrison was located at his home in [facility] [place] [address] at the time of the visit. The provider, Pradeep Raya was located at the medical office at [address] [place] [address], [place] [id number] at the time of the telehealth encounter. Verbal consent was given on October 1, 2020 by the patient, her-self.

## 2020-10-02 NOTE — DISCUSSION/SUMMARY
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 Her brain fog maybe better and she feels less swollen since at home. memory lapses and difficulty remembering words persist, but seems slightly better. Additionally, she feels she needs to continue with Klonopin at night as evening Namenda dose causes restlessness and had affected her sleep. We agreed she reaches out to DR. Mendoza to see if the timing of Rx can be changed and if she needs additional Provigil in AM as he suggested in his note.\par ANXIETY; In addition she is tearful during the visit expressing significant anxiety with leaving her house, fearing worsening balance or falls. She has been leaving her apartment seldom since at home and only if accompanied by her daughter.  I worry about accelerating depression and anxiety halting her recovery further. She will see DR. Mendoza later this months and may benefit from seeing a therapist in addition to curb and manage her anxiety better. \par PALPITATIONS: Her palpitations are better and she is off Propranolol for nearly a month. She still describes inappropriately increased HR with low activity level or bending, which I reassured her are related to dysauthonomia and will get better with increased activity/exercise over time.  \par ELEVATED D DIMER/AC; She will remain on Eliquis 5mg BID for now and pursue visit with DR Hernandez despite -ve workup for Lupus and antiphospholipid Ab so far (these ought to be repeated 2-3 times for best sensitivity).  We need to determine duration of A/C at this time. She has been on it since April 2020 with persistently and lately elevated D dimer to >400 again.  \par SOB/CHEST PAIN: When at home her wheezing and dyspnea is absent. She stopped Qvar and nebulizer in late September and has not been using any except for rare Albuterol MDI INH when getting out of house. Increase in activity is highly recommended and she will do her best to go out for few minutes every day and have emergency inhaler with her just in case.  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 I recommended reassessing in a week with tentative plan to resume work in the next couple of weeks with following restrictions: working from home and with shorter hours. We will then continue interval assessments every couple of weeks so that she can gradually return to office and hopefully resume her full time job in the months to come.  \par \par

## 2020-10-02 NOTE — HISTORY OF PRESENT ILLNESS
[FreeTextEntry1] : DrEsdras Christy was diagnosed with corona virus infection complicated by pneumonia, persistently elevated inflammatory markers and severe physical and mental incapacity since late March 2020 and had complicated disease course necessitating prolonged disability until her return to work in June 2020. At the time of her return to the office setting she was placed on restricted time working three days a week however continued to experience Rt leg weakness, substantial dis balance, vertigo and palpitations along with bronchospasm which required oral steroids and standing inhaler use.  In early September she noted issues with physical and mental exhaustion associated with persistently elevated infalmmatory markers and weight shifts that would cause up to 8 lbs weight gain in a 24 hrs at times. 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 After seeing DR. Mendoza on 9/17/2020 he was placed back on full A/C with Eliquis at 5mg BID and started on titarting schedule with Venlaflaxine at 37.5mg QD with Namenda 5mg witth plan to titrate the dose up in 2 weeks. She was going to pursue memory and occupational PT and work on her balance and strength as much as possible. Decision was made on 9/16/2020 after speaking to her that she would be better off taking some time off given episodic confusion, memory lapses and difficulties in completing low-moderate complexity duties at her current job. This in additional to physical exhaustion would not be beneficial for her recovery and would pose unacceptable errors at work.

## 2020-10-16 ENCOUNTER — APPOINTMENT (OUTPATIENT)
Dept: HEART AND VASCULAR | Facility: CLINIC | Age: 45
End: 2020-10-16
Payer: COMMERCIAL

## 2020-10-16 DIAGNOSIS — F43.0 PANIC DISORDER [EPISODIC PAROXYSMAL ANXIETY]: ICD-10-CM

## 2020-10-16 DIAGNOSIS — F41.0 PANIC DISORDER [EPISODIC PAROXYSMAL ANXIETY]: ICD-10-CM

## 2020-10-16 DIAGNOSIS — R26.89 OTHER ABNORMALITIES OF GAIT AND MOBILITY: ICD-10-CM

## 2020-10-16 PROCEDURE — 99214 OFFICE O/P EST MOD 30 MIN: CPT | Mod: 95

## 2020-10-16 NOTE — REASON FOR VISIT
[Follow-Up - Clinic] : a clinic follow-up of [FreeTextEntry1] : The visit was provided via telehealth using real-time 2 way audio visual technology via [employer] platform as she failed to connect via [employer] due to the firewall issues and poor WiFi. The patient Rainer Harrison was located at his home in [facility] [place] [address] at the time of the visit. The provider, Pradeep Raya was located at the medical office at [address] [place] [address], [place] [address] at the time of the telehealth encounter. Verbal consent was given on October 16, 2020 by the patient, her-self.   [FreeTextEntry2] : to evalauate for disability timeline and return to work

## 2020-10-16 NOTE — DISCUSSION/SUMMARY
[___ Week(s)] : [unfilled] week(s) [FreeTextEntry1] : MENTAL SLUGGISHNESS/BRAIN FOG: It is getting better, and so is her focus. She feels with increased Venlaflaxine at 75mg in AM and evening Memantine 5mg she made a progress.  She is due to see DR. Mendoza on 10/27/2020, at which point she may be considered for additional Provigil in AM as he suggested in his note.\par ANXIETY; This is not getting much better, and she uses Clonoazepam at 0.5mg BID nearly every day to curb her anxiety. She has not left her home since leaving work more than few times, and is due to see therapist in the next week or two. Sleep has improved\par PALPITATIONS: Although improved overall, there have been few episodic HR of 110-120 while at home, usually caused by increased activity or comotion. However she managed to overcome it w/o the use of additional Propranolol. She has not used PRN BB for over a month. Again, I reassured her these are related to dysautonomia and will get better with increased activity/exercise over time. Physical activity, even at home, is superbly important, she understands and will do her best to get more active.  \par ELEVATED D DIMER/AC; She will remain on Eliquis 5mg BID for now and pursue visit with DR Hernandez later this month. So far all of her blood tests were -ve for Lupus and antiphospholipid Ab (these ought to be repeated 2-3 times for best sensitivity).  We need to determine duration of A/C at this time. She has been on it since April 2020 with persistently and lately elevated D dimer to >400 again.  She is sickle cell trait carrier. \par SOB/CHEST PAIN: Stable, overall better. When at home her wheezing and dyspnea is absent. She stopped Qvar and keeps using nebulizer only PRN. \par VERTIGO/DISBALANCE: Much better. S/P PT, reports measurable improvement. Some Rt arm weakness persists, while RT leg mostly recovered. Balance is measurably better.\par WORK DISABILITY: At this time brain fog and confusion appear better, her memory has grossly improved, although not completely. She struggles with significant and limiting depression and anxiety and still experiences generalized weakness, enough to keep her at home. After long discussion we determined it would be in her best interest to resume her job in approximately a week from now starting Monday 10/26/2020 but working remotely from home  and no longer than 4 hrs daily (Monday-Friday). Would there be any worsening or issues she will contact me sooner. We will observe how her progress goes before advancing hours further or moving her back to in-office location on a later date. \par \par  [With Me] : with me

## 2020-10-23 ENCOUNTER — APPOINTMENT (OUTPATIENT)
Dept: NEUROLOGY | Facility: CLINIC | Age: 45
End: 2020-10-23
Payer: COMMERCIAL

## 2020-10-23 PROCEDURE — 96116 NUBHVL XM PHYS/QHP 1ST HR: CPT | Mod: 95

## 2020-10-27 ENCOUNTER — APPOINTMENT (OUTPATIENT)
Dept: NEUROLOGY | Facility: CLINIC | Age: 45
End: 2020-10-27

## 2020-10-27 ENCOUNTER — NON-APPOINTMENT (OUTPATIENT)
Age: 45
End: 2020-10-27

## 2020-10-29 ENCOUNTER — APPOINTMENT (OUTPATIENT)
Dept: HEMATOLOGY ONCOLOGY | Facility: CLINIC | Age: 45
End: 2020-10-29

## 2020-10-29 ENCOUNTER — APPOINTMENT (OUTPATIENT)
Dept: NEUROLOGY | Facility: CLINIC | Age: 45
End: 2020-10-29

## 2020-11-06 ENCOUNTER — APPOINTMENT (OUTPATIENT)
Dept: HEART AND VASCULAR | Facility: CLINIC | Age: 45
End: 2020-11-06
Payer: COMMERCIAL

## 2020-11-06 DIAGNOSIS — M51.06 INTERVERTEBRAL DISC DISORDERS WITH MYELOPATHY, LUMBAR REGION: ICD-10-CM

## 2020-11-06 DIAGNOSIS — R06.02 SHORTNESS OF BREATH: ICD-10-CM

## 2020-11-06 DIAGNOSIS — G93.3 POSTVIRAL FATIGUE SYNDROME: ICD-10-CM

## 2020-11-06 DIAGNOSIS — U07.1 COVID-19: ICD-10-CM

## 2020-11-06 DIAGNOSIS — Z86.59 PERSONAL HISTORY OF OTHER MENTAL AND BEHAVIORAL DISORDERS: ICD-10-CM

## 2020-11-06 DIAGNOSIS — J12.82 COVID-19: ICD-10-CM

## 2020-11-06 DIAGNOSIS — Z86.19 PERSONAL HISTORY OF OTHER INFECTIOUS AND PARASITIC DISEASES: ICD-10-CM

## 2020-11-06 DIAGNOSIS — R00.2 PALPITATIONS: ICD-10-CM

## 2020-11-06 DIAGNOSIS — R79.89 OTHER SPECIFIED ABNORMAL FINDINGS OF BLOOD CHEMISTRY: ICD-10-CM

## 2020-11-06 DIAGNOSIS — R29.898 OTHER SYMPTOMS AND SIGNS INVOLVING THE MUSCULOSKELETAL SYSTEM: ICD-10-CM

## 2020-11-06 PROCEDURE — 99214 OFFICE O/P EST MOD 30 MIN: CPT | Mod: 95

## 2020-11-06 NOTE — DISCUSSION/SUMMARY
[___ Week(s)] : [unfilled] week(s) [With Me] : with me [FreeTextEntry1] : MENTAL SLUGGISHNESS/BRAIN FOG: It is getting better, and so is her focus. She feels with increased Venlaflaxine at 75mg in AM and evening Memantine 5mg she made a progress.  Idea of Provigil was deferred as it may stimulate panic/anxiety. She will continue to F/U DR. Stevens.\par ANXIETY; Stable, however persisting despite Effexor and PRN Clonazepam 0.5mg BID. Due to see Dr. Andre Early in consultation later this week. She has gone out since we spoke last on several occasions feeling better in terms of stamina, balance and leg pain however still struggles with disproportional anticipation and anxiety. Sleep continues to  improve\par PALPITATIONS: Much better overall, now completely of BB (Popranolol). Increase activity as much as possible. Again, I reassured her these are related to dysautonomia and will get better with increased activity/exercise over time. Physical activity, even at home, is superbly important, she understands and will do her best to get more active.  \par ELEVATED D DIMER/AC; She will remain on Eliquis 5mg BID for now and pursue visit with DR Hernandez later this month. So far all of her blood tests were -ve for Lupus and antiphospholipid Ab (these ought to be repeated 2-3 times for best sensitivity).  We need to determine duration of A/C at this time. She has been on it since April 2020 with persistently and lately elevated D dimer to >400 again.  She is sickle cell trait carrier. \par SOB/CHEST PAIN: Stable, overall better. When at home her wheezing and dyspnea is absent. She stopped Qvar and keeps using nebulizer only PRN. \par VERTIGO/DISBALANCE:Practically resolved. S/P PT, reports measurable improvement. Some Rt arm weakness persists, while RT leg mostly recovered. Balance is measurably better.\par WORK DISABILITY: At this time brain fog and confusion appear to be better, her memory has grossly improved, overall. She struggles with significant and limiting anxiety and still experiences some generalized weakness and excessive fatigue, enough to keep her at home. We determined it would be reasonable to resume her job next week Monday 11/9/2020 but working remotely from home  and no longer than 4 hrs daily (Monday-Friday) until Monday December 21st, 2020 when she could go back to office and resume her duties on a full time basis. Would there be any worsening or issues she will contact me sooner. \par \par

## 2020-11-06 NOTE — REASON FOR VISIT
[Follow-Up - Clinic] : a clinic follow-up of [FreeTextEntry2] : to evalauate for disability timeline and return to work [FreeTextEntry1] : The visit was provided via telehealth using real-time 2 way audio visual technology via [employer] platform as she failed to connect via [employer] due to the firewall issues and poor WiFi. The patient Rainer Harrison was located at his home in [facility] [place] [address] at the time of the visit. The provider, Pradeep Raya was located at the medical office at [address] [place] [address], [place] [address] at the time of the telehealth encounter. Verbal consent was given on November 6, 2020 by the patient, her-self.

## 2020-11-23 ENCOUNTER — TRANSCRIPTION ENCOUNTER (OUTPATIENT)
Age: 45
End: 2020-11-23

## 2020-12-23 PROBLEM — Z87.09 HISTORY OF ACUTE BRONCHITIS: Status: RESOLVED | Noted: 2020-03-29 | Resolved: 2020-12-23

## 2020-12-23 PROBLEM — Z87.09 HISTORY OF SORE THROAT: Status: RESOLVED | Noted: 2020-05-12 | Resolved: 2020-12-23

## 2020-12-25 LAB — SARS-COV-2 N GENE NPH QL NAA+PROBE: NOT DETECTED

## 2021-03-29 ENCOUNTER — NON-APPOINTMENT (OUTPATIENT)
Age: 46
End: 2021-03-29

## 2021-03-29 LAB — SARS-COV-2 N GENE NPH QL NAA+PROBE: NOT DETECTED

## 2021-04-13 ENCOUNTER — APPOINTMENT (OUTPATIENT)
Dept: NEUROLOGY | Facility: CLINIC | Age: 46
End: 2021-04-13
Payer: COMMERCIAL

## 2021-04-13 VITALS
OXYGEN SATURATION: 97 % | HEIGHT: 66 IN | WEIGHT: 177 LBS | HEART RATE: 73 BPM | TEMPERATURE: 98.3 F | BODY MASS INDEX: 28.45 KG/M2 | DIASTOLIC BLOOD PRESSURE: 81 MMHG | RESPIRATION RATE: 14 BRPM | SYSTOLIC BLOOD PRESSURE: 119 MMHG

## 2021-04-13 PROCEDURE — 99072 ADDL SUPL MATRL&STAF TM PHE: CPT

## 2021-04-13 PROCEDURE — 99213 OFFICE O/P EST LOW 20 MIN: CPT

## 2021-04-13 RX ORDER — BECLOMETHASONE DIPROPIONATE HFA 80 UG/1
80 AEROSOL, METERED RESPIRATORY (INHALATION)
Qty: 1 | Refills: 3 | Status: DISCONTINUED | COMMUNITY
Start: 2020-07-30 | End: 2021-04-13

## 2021-04-13 RX ORDER — OMEPRAZOLE 40 MG/1
40 CAPSULE, DELAYED RELEASE ORAL
Qty: 1 | Refills: 10 | Status: DISCONTINUED | COMMUNITY
Start: 2020-08-19 | End: 2021-04-13

## 2021-04-13 RX ORDER — GABAPENTIN 100 MG/1
100 CAPSULE ORAL
Qty: 90 | Refills: 3 | Status: DISCONTINUED | COMMUNITY
Start: 2021-02-11 | End: 2021-04-13

## 2021-04-13 RX ORDER — HYDROXYCHLOROQUINE SULFATE 200 MG/1
200 TABLET, FILM COATED ORAL
Qty: 12 | Refills: 1 | Status: DISCONTINUED | COMMUNITY
Start: 2020-12-22 | End: 2021-04-13

## 2021-04-13 RX ORDER — ZINC SULFATE 50(220)MG
220 (50 ZN) CAPSULE ORAL TWICE DAILY
Qty: 28 | Refills: 0 | Status: DISCONTINUED | COMMUNITY
Start: 2020-03-29 | End: 2021-04-13

## 2021-04-13 RX ORDER — APIXABAN 5 MG/1
5 TABLET, FILM COATED ORAL
Qty: 60 | Refills: 3 | Status: DISCONTINUED | COMMUNITY
Start: 2020-04-16 | End: 2021-04-13

## 2021-05-03 NOTE — HISTORY OF PRESENT ILLNESS
[FreeTextEntry1] : Pt is 45yoF with PMH Covid 19+ here for neurological f/u visit. Pt is pleased to report that she is doing well.\par Reports Improved balance. Memory improved but not back to baseline. "Now I can multitask again." Also reports improved energy, resolution of brain fog. Still has weakness R hand, R hand numbness. No pain in RUE. Has trouble with fine motor tasks in her UE, but improving. Says R leg is still painful, happens less frequently. Still has RLE swelling intermittently, but improved.  Anxiety improved after starting Klonopin. Mood improved, "better than before Covid." Balance and walking improved after doing home exercises. Sleeping better now that she takes Klonopin. Denies any new neurological complaints. Is nervous to take Covid vaccine due to side effects. \par \par \par \par

## 2021-05-03 NOTE — ASSESSMENT
[FreeTextEntry1] : Pt doing well, says all of her post-covid symptoms improving. Nervous to get covid vaccine\par \par Plan:\par Pt encouraged to get Covid vaccine (preferably pfizer or moderna), teaching provided to pt regarding potential side effects, risk vs benefit of getting vaccine. Pt verbalizes understanding, appears motivated to get vaccine. \par

## 2021-05-03 NOTE — PHYSICAL EXAM
[FreeTextEntry1] : The patient is alert and oriented x3, naming intact x3, repetition normal, follows three-step commands, and is able to participate fully in the history taking.\par Speech is normal with no evidence of dysarthria.\par Memory is intact: Immediate recall 3 out of 3, short-term 3 out of 3, remote memory intact\par Cranial nerves II through XII intact\par Motor exam: Upper and lower extremities 5 out of 5 power, normal tone. No abnormal movements noted.\par Sensory exam: Intact to light touch and pinprick. Romberg negative.\par Coordination and vestibular exam: Finger to nose intact, no evidence of truncal or appendicular ataxia. No evidence of nystagmus. no vestibular symptoms elicited with head turning during ambulation.\par Gait: Normal stance and gait.\par Reflexes: One to 2+ in upper and lower extremities. No pathological reflexes. Downgoing toes.\par

## 2021-06-14 LAB
M TB IFN-G BLD-IMP: NEGATIVE
QUANTIFERON TB PLUS MITOGEN MINUS NIL: 9.08 IU/ML
QUANTIFERON TB PLUS NIL: 0.04 IU/ML
QUANTIFERON TB PLUS TB1 MINUS NIL: -0.01 IU/ML
QUANTIFERON TB PLUS TB2 MINUS NIL: -0.01 IU/ML

## 2021-08-12 LAB — SARS-COV-2 N GENE NPH QL NAA+PROBE: NOT DETECTED

## 2021-09-08 ENCOUNTER — TRANSCRIPTION ENCOUNTER (OUTPATIENT)
Age: 46
End: 2021-09-08

## 2021-09-17 ENCOUNTER — APPOINTMENT (OUTPATIENT)
Dept: HEART AND VASCULAR | Facility: CLINIC | Age: 46
End: 2021-09-17
Payer: COMMERCIAL

## 2021-09-17 DIAGNOSIS — Z23 ENCOUNTER FOR IMMUNIZATION: ICD-10-CM

## 2021-09-17 PROCEDURE — 0001A: CPT

## 2021-10-06 PROBLEM — U07.1 PNEUMONIA DUE TO COVID-19 VIRUS: Status: ACTIVE | Noted: 2020-04-30

## 2021-12-21 LAB — SARS-COV-2 N GENE NPH QL NAA+PROBE: NOT DETECTED

## 2021-12-28 LAB — SARS-COV-2 N GENE NPH QL NAA+PROBE: NOT DETECTED

## 2022-02-14 LAB — SARS-COV-2 N GENE NPH QL NAA+PROBE: NOT DETECTED

## 2022-02-15 ENCOUNTER — LABORATORY RESULT (OUTPATIENT)
Age: 47
End: 2022-02-15

## 2022-02-17 DIAGNOSIS — N39.0 URINARY TRACT INFECTION, SITE NOT SPECIFIED: ICD-10-CM

## 2022-02-17 LAB
APPEARANCE: CLEAR
BILIRUBIN URINE: NEGATIVE
BLOOD URINE: NEGATIVE
COLOR: NORMAL
GLUCOSE QUALITATIVE U: NEGATIVE
KETONES URINE: NEGATIVE
LEUKOCYTE ESTERASE URINE: ABNORMAL
NITRITE URINE: POSITIVE
PH URINE: 7.5
PROTEIN URINE: NEGATIVE
SPECIFIC GRAVITY URINE: 1.01
UROBILINOGEN URINE: NORMAL

## 2022-04-19 LAB — SARS-COV-2 N GENE NPH QL NAA+PROBE: NOT DETECTED

## 2022-04-29 DIAGNOSIS — R05.9 COUGH, UNSPECIFIED: ICD-10-CM

## 2022-05-02 LAB — SARS-COV-2 N GENE NPH QL NAA+PROBE: NOT DETECTED

## 2022-09-01 ENCOUNTER — APPOINTMENT (OUTPATIENT)
Dept: NEUROLOGY | Facility: CLINIC | Age: 47
End: 2022-09-01
Payer: COMMERCIAL

## 2022-09-01 ENCOUNTER — NON-APPOINTMENT (OUTPATIENT)
Age: 47
End: 2022-09-01

## 2022-09-01 VITALS
BODY MASS INDEX: 32.14 KG/M2 | HEIGHT: 66 IN | HEART RATE: 72 BPM | OXYGEN SATURATION: 100 % | SYSTOLIC BLOOD PRESSURE: 124 MMHG | WEIGHT: 200 LBS | DIASTOLIC BLOOD PRESSURE: 79 MMHG | TEMPERATURE: 97.4 F

## 2022-09-01 PROCEDURE — 99213 OFFICE O/P EST LOW 20 MIN: CPT | Mod: 1L

## 2022-09-01 RX ORDER — IVERMECTIN 3 MG/1
3 TABLET ORAL
Qty: 20 | Refills: 3 | Status: DISCONTINUED | COMMUNITY
Start: 2021-09-15 | End: 2022-09-01

## 2022-09-01 RX ORDER — IPRATROPIUM BROMIDE 0.5 MG/2.5ML
0.02 SOLUTION RESPIRATORY (INHALATION) EVERY 4 HOURS
Qty: 3 | Refills: 3 | Status: DISCONTINUED | COMMUNITY
Start: 2020-09-08 | End: 2022-09-01

## 2022-09-01 RX ORDER — BLOOD-GLUCOSE METER
KIT MISCELLANEOUS
Qty: 1 | Refills: 0 | Status: DISCONTINUED | COMMUNITY
Start: 2020-09-08 | End: 2022-09-01

## 2022-09-01 RX ORDER — AMOXICILLIN AND CLAVULANATE POTASSIUM 875; 125 MG/1; MG/1
875-125 TABLET, COATED ORAL
Qty: 6 | Refills: 0 | Status: DISCONTINUED | COMMUNITY
Start: 2022-05-03 | End: 2022-09-01

## 2022-09-01 RX ORDER — CEFPODOXIME PROXETIL 200 MG/1
200 TABLET, FILM COATED ORAL
Qty: 20 | Refills: 1 | Status: DISCONTINUED | COMMUNITY
Start: 2022-02-17 | End: 2022-09-01

## 2022-09-01 RX ORDER — CLONAZEPAM 0.5 MG/1
0.5 TABLET ORAL
Qty: 90 | Refills: 1 | Status: DISCONTINUED | COMMUNITY
Start: 2020-09-08 | End: 2022-09-01

## 2022-09-01 RX ORDER — ALBUTEROL SULFATE 90 UG/1
108 (90 BASE) INHALANT RESPIRATORY (INHALATION) EVERY 4 HOURS
Qty: 1 | Refills: 3 | Status: DISCONTINUED | COMMUNITY
Start: 2020-07-21 | End: 2022-09-01

## 2022-09-01 RX ORDER — CETIRIZINE HYDROCHLORIDE 10 MG/1
10 TABLET, COATED ORAL
Qty: 90 | Refills: 3 | Status: DISCONTINUED | COMMUNITY
Start: 2021-09-15 | End: 2022-09-01

## 2022-09-01 RX ORDER — METHYLPREDNISOLONE 4 MG/1
4 TABLET ORAL
Qty: 1 | Refills: 1 | Status: DISCONTINUED | COMMUNITY
Start: 2021-09-15 | End: 2022-09-01

## 2022-10-03 LAB — SARS-COV-2 N GENE NPH QL NAA+PROBE: NOT DETECTED

## 2022-12-30 NOTE — HISTORY OF PRESENT ILLNESS
[FreeTextEntry1] : Interval course : Doing well with memantine\par \par \par HPI: Pt is 45yoF with PMH Covid 19+ here for neurological f/u visit. Pt is pleased to report that she is doing well.\par Reports Improved balance. Memory improved but not back to baseline. "Now I can multitask again." Also reports improved energy, resolution of brain fog. Still has weakness R hand, R hand numbness. No pain in RUE. Has trouble with fine motor tasks in her UE, but improving. Says R leg is still painful, happens less frequently. Still has RLE swelling intermittently, but improved.  Anxiety improved after starting Klonopin. Mood improved, "better than before Covid." Balance and walking improved after doing home exercises. Sleeping better now that she takes Klonopin. Denies any new neurological complaints. Is nervous to take Covid vaccine due to side effects. \par \par \par \par

## 2023-01-03 ENCOUNTER — APPOINTMENT (OUTPATIENT)
Dept: HEART AND VASCULAR | Facility: CLINIC | Age: 48
End: 2023-01-03

## 2023-01-30 ENCOUNTER — APPOINTMENT (OUTPATIENT)
Dept: HEART AND VASCULAR | Facility: CLINIC | Age: 48
End: 2023-01-30
Payer: COMMERCIAL

## 2023-01-30 VITALS
BODY MASS INDEX: 33.75 KG/M2 | TEMPERATURE: 97.6 F | WEIGHT: 210 LBS | HEART RATE: 71 BPM | DIASTOLIC BLOOD PRESSURE: 82 MMHG | SYSTOLIC BLOOD PRESSURE: 130 MMHG | OXYGEN SATURATION: 99 % | HEIGHT: 66 IN

## 2023-01-30 DIAGNOSIS — R07.9 CHEST PAIN, UNSPECIFIED: ICD-10-CM

## 2023-01-30 DIAGNOSIS — Z28.21 IMMUNIZATION NOT CARRIED OUT BECAUSE OF PATIENT REFUSAL: ICD-10-CM

## 2023-01-30 PROCEDURE — 99396 PREV VISIT EST AGE 40-64: CPT | Mod: 25

## 2023-01-30 PROCEDURE — 36415 COLL VENOUS BLD VENIPUNCTURE: CPT

## 2023-01-30 PROCEDURE — 93000 ELECTROCARDIOGRAM COMPLETE: CPT

## 2023-01-30 PROCEDURE — ZZZZZ: CPT

## 2023-01-30 PROCEDURE — 93306 TTE W/DOPPLER COMPLETE: CPT

## 2023-01-30 NOTE — DISCUSSION/SUMMARY
[FreeTextEntry1] : The patient has atypical chest pain.  Her EKG is normal.  She will return for stress echocardiogram.  She will undergo an echocardiogram today.  She will reduce her intake of alcohol.  She declines the flu or tetanus vaccine.  She has mild excessive bleeding.  Coagulation studies will be done.  The patient will contact me what the names of her other providers. She is not having any cognitive impairment. She has no significant current or past depression. She is fully functional and there are no safety issues. She will be screened for eye examination, Colonoscopy,  Pap smear, Bone density, Mammogram and Immunizations over the next 10 years. The patient was furnished with personalized health advice and does not need referral to health education or preventative counseling services. Patient does not need advanced care planning services.  Laboratory testing was done. [EKG obtained to assist in diagnosis and management of assessed problem(s)] : EKG obtained to assist in diagnosis and management of assessed problem(s)

## 2023-01-30 NOTE — HISTORY OF PRESENT ILLNESS
[FreeTextEntry1] : The patient has had a pinching chest pain.  She has gained weight.  She has mild joint pain.  Last week she had left upper chest pain radiating to her shoulder which resolved.  Itching is last for 3 days and occurs all day.  It is worse on the subway stairs which she does slowly but does not stop.  This is similar to the pain that she had with COVID-pneumonia.  The patient has reflux with bloating and lower abdominal discomfort and burning in her throat.  She takes omeprazole as needed.  On some weekend states she has 3 alcoholic drinks.  She has less than 1 caffeinated drink a day.  If she has more she sometimes gets palpitations.  She had an eye exam in November, Pap smear in June, has had 1 mammogram.  Never had colonoscopy.  She had 2 COVID vaccines.  She has not had a flu or tetanus vaccine.  She has noticed some excessive bleeding.  She saw the dentist last week.

## 2023-01-30 NOTE — PHYSICAL EXAM
[Well Developed] : well developed [Well Nourished] : well nourished [No Acute Distress] : no acute distress [Normal Conjunctiva] : normal conjunctiva [Normal Venous Pressure] : normal venous pressure [No Carotid Bruit] : no carotid bruit [Normal S1, S2] : normal S1, S2 [No Murmur] : no murmur [No Rub] : no rub [No Gallop] : no gallop [Clear Lung Fields] : clear lung fields [Good Air Entry] : good air entry [No Respiratory Distress] : no respiratory distress  [Soft] : abdomen soft [Non Tender] : non-tender [No Masses/organomegaly] : no masses/organomegaly [Normal Bowel Sounds] : normal bowel sounds [Normal Gait] : normal gait [No Edema] : no edema [No Cyanosis] : no cyanosis [No Clubbing] : no clubbing [No Varicosities] : no varicosities [No Rash] : no rash [No Skin Lesions] : no skin lesions [Moves all extremities] : moves all extremities [No Focal Deficits] : no focal deficits [Normal Speech] : normal speech [Alert and Oriented] : alert and oriented [Normal memory] : normal memory [de-identified] :  Rectal exam refused  [de-identified] : Breast exam refused

## 2023-01-30 NOTE — REVIEW OF SYSTEMS
[Chest Discomfort] : chest discomfort [Abdominal Pain] : abdominal pain [Heartburn] : heartburn [Negative] : Heme/Lymph

## 2023-01-31 ENCOUNTER — NON-APPOINTMENT (OUTPATIENT)
Age: 48
End: 2023-01-31

## 2023-01-31 LAB
ALBUMIN SERPL ELPH-MCNC: 4.4 G/DL
ALP BLD-CCNC: 57 U/L
ALT SERPL-CCNC: 24 U/L
ANION GAP SERPL CALC-SCNC: 11 MMOL/L
APTT BLD: 26.8 SEC
AST SERPL-CCNC: 20 U/L
BASOPHILS # BLD AUTO: 0.02 K/UL
BASOPHILS NFR BLD AUTO: 0.3 %
BILIRUB DIRECT SERPL-MCNC: 0.1 MG/DL
BILIRUB INDIRECT SERPL-MCNC: 0.2 MG/DL
BILIRUB SERPL-MCNC: 0.3 MG/DL
BUN SERPL-MCNC: 11 MG/DL
CALCIUM SERPL-MCNC: 9.1 MG/DL
CHLORIDE SERPL-SCNC: 101 MMOL/L
CHOLEST SERPL-MCNC: 212 MG/DL
CO2 SERPL-SCNC: 25 MMOL/L
CREAT SERPL-MCNC: 0.71 MG/DL
EGFR: 106 ML/MIN/1.73M2
EOSINOPHIL # BLD AUTO: 0.04 K/UL
EOSINOPHIL NFR BLD AUTO: 0.6 %
ESTIMATED AVERAGE GLUCOSE: 103 MG/DL
GLUCOSE SERPL-MCNC: 101 MG/DL
HBA1C MFR BLD HPLC: 5.2 %
HCT VFR BLD CALC: 38.2 %
HDLC SERPL-MCNC: 47 MG/DL
HGB BLD-MCNC: 13.1 G/DL
IMM GRANULOCYTES NFR BLD AUTO: 0.4 %
INR PPP: 1.03 RATIO
LDLC SERPL CALC-MCNC: 114 MG/DL
LYMPHOCYTES # BLD AUTO: 2.06 K/UL
LYMPHOCYTES NFR BLD AUTO: 29.6 %
MAN DIFF?: NORMAL
MCHC RBC-ENTMCNC: 31 PG
MCHC RBC-ENTMCNC: 34.3 GM/DL
MCV RBC AUTO: 90.5 FL
MONOCYTES # BLD AUTO: 0.48 K/UL
MONOCYTES NFR BLD AUTO: 6.9 %
NEUTROPHILS # BLD AUTO: 4.32 K/UL
NEUTROPHILS NFR BLD AUTO: 62.2 %
NONHDLC SERPL-MCNC: 165 MG/DL
PLATELET # BLD AUTO: 189 K/UL
POTASSIUM SERPL-SCNC: 4.4 MMOL/L
PROT SERPL-MCNC: 7 G/DL
PT BLD: 12 SEC
RBC # BLD: 4.22 M/UL
RBC # FLD: 12.6 %
SODIUM SERPL-SCNC: 137 MMOL/L
TRIGL SERPL-MCNC: 256 MG/DL
TSH SERPL-ACNC: 1.15 UIU/ML
WBC # FLD AUTO: 6.95 K/UL

## 2023-02-08 ENCOUNTER — APPOINTMENT (OUTPATIENT)
Dept: BARIATRICS | Facility: CLINIC | Age: 48
End: 2023-02-08
Payer: COMMERCIAL

## 2023-02-08 VITALS
BODY MASS INDEX: 34.89 KG/M2 | WEIGHT: 209.44 LBS | HEIGHT: 65 IN | OXYGEN SATURATION: 98 % | DIASTOLIC BLOOD PRESSURE: 83 MMHG | SYSTOLIC BLOOD PRESSURE: 138 MMHG | HEART RATE: 94 BPM | TEMPERATURE: 97.1 F

## 2023-02-08 DIAGNOSIS — R12 HEARTBURN: ICD-10-CM

## 2023-02-08 DIAGNOSIS — Z87.39 PERSONAL HISTORY OF OTHER DISEASES OF THE MUSCULOSKELETAL SYSTEM AND CONNECTIVE TISSUE: ICD-10-CM

## 2023-02-08 DIAGNOSIS — M25.473 EFFUSION, UNSPECIFIED ANKLE: ICD-10-CM

## 2023-02-08 DIAGNOSIS — E66.01 MORBID (SEVERE) OBESITY DUE TO EXCESS CALORIES: ICD-10-CM

## 2023-02-08 DIAGNOSIS — E78.1 PURE HYPERGLYCERIDEMIA: ICD-10-CM

## 2023-02-08 DIAGNOSIS — I83.90 ASYMPTOMATIC VARICOSE VEINS OF UNSPECIFIED LOWER EXTREMITY: ICD-10-CM

## 2023-02-08 DIAGNOSIS — I10 ESSENTIAL (PRIMARY) HYPERTENSION: ICD-10-CM

## 2023-02-08 PROCEDURE — 99204 OFFICE O/P NEW MOD 45 MIN: CPT

## 2023-02-08 NOTE — HISTORY OF PRESENT ILLNESS
[de-identified] : Patient is a 47 year old woman with history of COVID, pneumonia, joint pain, anxiety, palpitations, previous abdominoplasty who presents for evaluation of bariatric surgery. \par The patient has been overweight for more than 5 years and has tried multiple diet regimens which resulted in weight loss that was regained in the months following the diet. The patient has also tried fad diets to lose weight without success.\par \par Smoking history? Previous smoker, now quit\par Alcohol use? Social\par NSAID use? Denies\par Cardiac/Vascular Stents: Denies\par ICD/Pacemaker: Denies\par Blood Thinners: Denies\par DVT/PE History: Denies\par Swallowing Issues: Denies\par GERD/GI Symptoms: Intermittent, on omeprazole daily\par \par STOP BANG Questionnaire\par Does patient snore? 1\par Does patient often feel tired, fatigued, or sleepy during daytime? 1\par Has anyone observed patient stop breathing during sleep? 1\par Does patient have or is being treated for high blood pressure? 0\par BMI > 35? 1\par Age > 50 years old? 0\par Male gender? 0\par SCORE =  4\par \par LISSET - Low Risk : Yes to 0-2 questions\par LISSET - Intermediate Risk: Yes to 3-4 questions\par LISSET - High Risk: Yes to 5-8 questions\par \par

## 2023-02-08 NOTE — ASSESSMENT
[FreeTextEntry1] : It was my pleasure to interact with Ms. ELOISE FLOREZ today. In summary, Ms. ELOISE FLOREZ has morbid obesity refractory to medical and dietary efforts for which She could benefit from weight loss surgery.  We discussed in detail the Ramos-en-Y gastric bypass, sleeve gastrectomy, and modified duodenal switch (CHUY/SIPS). She understood that these procedures are done primarily minimally invasively (laparoscopically or robotically), but that there is a possibility of conversion to laparotomy. In this particular case, with their body mass index we discussed realistic expectations with respect to weight loss.  Approximately 50% of excess weight will be lost if She has gastric bypass or sleeve gastrectomy, or, approximately 60% if She  has CHUY/SIPS.  In order to maintain weight loss or lose more weight, She will be required to modify diet and exercise habits (the "tool" concept of weight loss surgery).  We discussed the necessity for continuous, life-long medical care following surgery and the necessity for taking mineral and vitamin supplements daily for the rest of life. We discussed the importance of high protein diet and daily exercise for maximal success.\par \par We discussed complications that may follow bariatric surgery that include, but are not limited to, respiratory problems, pneumonia, thrombophlebitis, and pulmonary embolus.  We also discussed intra-abdominal complications including anastomotic leak, intra-abdominal infections, portal vein thrombosis and death that may result from bariatric surgery.  We discussed that there may be other complications related to a specific type of surgery, such as that gastric bypass patients may have severe dumping secondary to dietary indiscretion. With respect to sleeve gastrectomy we discussed the chances of having refractory GERD that may require intervention in the future including conversion to gastric bypass. We also discussed postoperative DVT prophylaxis to decrease the incidence of deep vein thrombosis when indicated. \par \par I specifically addressed the patient regarding pregnancy.  Weight loss surgery patients should not become pregnant in the first two years following surgery because of the high risk of fetal malformation and miscarriage.\par \par The prolonged discussion (greater than 45 minutes) centered primarily on the indications for surgery and evaluation of the risk/benefit ratio for Ms. ELOISE FLOREZ and other weight loss alternatives. I answered all questions about bariatric surgery and possible complications to the best of my ability.\par \par Once the preoperative evaluation is complete, I will review the chart and schedule the patient for a follow-up visit in order to answer any questions prior to scheduling surgery.\par \par Plan:\par Nutrition, Psych evaluations\par Pulmonary evaluations\par EGD+Bravo\par Interested in sleeve gastrectomy\par \par \par I, Dr. Hermelinda Monahan, spent 50 minutes with the patient >50% counseling/coordination of care including, reviewing the patient's history, performing an examination, reviewing relevant labs and radiographic imaging, reviewing PCP and consultant notes, discussion of medical and surgical management of the diagnosis as well as associated risks and benefits, and completing documentation.\par

## 2023-02-12 ENCOUNTER — NON-APPOINTMENT (OUTPATIENT)
Age: 48
End: 2023-02-12

## 2023-02-13 ENCOUNTER — OUTPATIENT (OUTPATIENT)
Dept: OUTPATIENT SERVICES | Facility: HOSPITAL | Age: 48
LOS: 1 days | End: 2023-02-13
Payer: COMMERCIAL

## 2023-02-13 ENCOUNTER — APPOINTMENT (OUTPATIENT)
Dept: PULMONOLOGY | Facility: CLINIC | Age: 48
End: 2023-02-13
Payer: COMMERCIAL

## 2023-02-13 ENCOUNTER — APPOINTMENT (OUTPATIENT)
Dept: RADIOLOGY | Facility: CLINIC | Age: 48
End: 2023-02-13

## 2023-02-13 VITALS
OXYGEN SATURATION: 99 % | DIASTOLIC BLOOD PRESSURE: 84 MMHG | BODY MASS INDEX: 35.32 KG/M2 | WEIGHT: 212 LBS | HEIGHT: 65 IN | SYSTOLIC BLOOD PRESSURE: 134 MMHG | HEART RATE: 85 BPM | TEMPERATURE: 98.6 F

## 2023-02-13 DIAGNOSIS — R06.83 SNORING: ICD-10-CM

## 2023-02-13 DIAGNOSIS — Z01.811 ENCOUNTER FOR PREPROCEDURAL RESPIRATORY EXAMINATION: ICD-10-CM

## 2023-02-13 PROCEDURE — 99204 OFFICE O/P NEW MOD 45 MIN: CPT

## 2023-02-13 PROCEDURE — ZZZZZ: CPT

## 2023-02-13 PROCEDURE — 71046 X-RAY EXAM CHEST 2 VIEWS: CPT | Mod: 26

## 2023-02-16 ENCOUNTER — APPOINTMENT (OUTPATIENT)
Dept: HEART AND VASCULAR | Facility: CLINIC | Age: 48
End: 2023-02-16

## 2023-02-16 PROBLEM — R06.83 SNORING: Status: ACTIVE | Noted: 2020-08-19

## 2023-02-16 NOTE — REVIEW OF SYSTEMS
[Fever] : no fever [Chills] : no chills [Dry Eyes] : no dry eyes [Eye Irritation] : no eye irritation [Cough] : no cough [Sputum] : no sputum [Dyspnea] : no dyspnea [SOB on Exertion] : no sob on exertion [Chest Discomfort] : no chest discomfort [Orthopnea] : no orthopnea [Hay Fever] : no hay fever [Nasal Discharge] : no nasal discharge [Nocturia] : no nocturia [Irregular Menses] : no irregular menses [Headache] : no headache [Dizziness] : no dizziness

## 2023-02-16 NOTE — PHYSICAL EXAM
[No Acute Distress] : no acute distress [Well Nourished] : well nourished [Normal Oropharynx] : normal oropharynx [Normal Appearance] : normal appearance [No JVD] : no jvd [Normal Rate/Rhythm] : normal rate/rhythm [Normal S1, S2] : normal s1, s2 [No Resp Distress] : no resp distress [Clear to Auscultation Bilaterally] : clear to auscultation bilaterally [Normal Gait] : normal gait [No Clubbing] : no clubbing [No Cyanosis] : no cyanosis [Oriented x3] : oriented x3 [Normal Affect] : normal affect

## 2023-02-16 NOTE — CONSULT LETTER
[Dear  ___] : Dear  [unfilled], [Consult Letter:] : I had the pleasure of evaluating your patient, [unfilled]. [Please see my note below.] : Please see my note below. [Consult Closing:] : Thank you very much for allowing me to participate in the care of this patient.  If you have any questions, please do not hesitate to contact me. [FreeTextEntry3] : Sincerely\par \par Enrrique Cool MD [facility]\par , [facility] [employer]\par Associate , Pulmonary and Critical Care Fellowship\par Pulmonary and Critical Care\par [facility]\par Phone: [phone]\par

## 2023-02-16 NOTE — HISTORY OF PRESENT ILLNESS
[Never] : never [TextBox_4] : 47 year old female, never smoker with h/o childhood asthma was referred to pulmonary clinic by Dr. Monahan for pulmonary evaluation before bariatric surgery\par Patient denies cough, SOB, chest pain or pulmonary illness in last 1 year. She is climb to three flights of stairs without stopping.  Patient is also c/o snoring but no witnessed apnea, early morning headache or choking at night time or excessive daytime sleepiness. ESS score is 8.\par  [ESS] : 8

## 2023-02-24 LAB — SARS-COV-2 N GENE NPH QL NAA+PROBE: NOT DETECTED

## 2023-02-27 ENCOUNTER — RESULT REVIEW (OUTPATIENT)
Age: 48
End: 2023-02-27

## 2023-02-27 ENCOUNTER — TRANSCRIPTION ENCOUNTER (OUTPATIENT)
Age: 48
End: 2023-02-27

## 2023-02-27 ENCOUNTER — APPOINTMENT (OUTPATIENT)
Dept: BARIATRICS | Facility: CLINIC | Age: 48
End: 2023-02-27

## 2023-02-27 ENCOUNTER — OUTPATIENT (OUTPATIENT)
Dept: OUTPATIENT SERVICES | Facility: HOSPITAL | Age: 48
LOS: 1 days | Discharge: HOME | End: 2023-02-27
Payer: COMMERCIAL

## 2023-02-27 VITALS
DIASTOLIC BLOOD PRESSURE: 83 MMHG | OXYGEN SATURATION: 100 % | HEART RATE: 60 BPM | RESPIRATION RATE: 18 BRPM | HEIGHT: 65 IN | TEMPERATURE: 96 F | SYSTOLIC BLOOD PRESSURE: 136 MMHG | WEIGHT: 214.07 LBS

## 2023-02-27 PROCEDURE — 43239 EGD BIOPSY SINGLE/MULTIPLE: CPT

## 2023-02-27 PROCEDURE — 88305 TISSUE EXAM BY PATHOLOGIST: CPT

## 2023-02-27 PROCEDURE — 88305 TISSUE EXAM BY PATHOLOGIST: CPT | Mod: 26

## 2023-02-27 PROCEDURE — C1889: CPT

## 2023-02-27 PROCEDURE — 91035 G-ESOPH REFLX TST W/ELECTROD: CPT | Mod: 26

## 2023-02-27 DEVICE — IMPLANTABLE DEVICE: Type: IMPLANTABLE DEVICE | Status: FUNCTIONAL

## 2023-02-27 NOTE — PRE-ANESTHESIA EVALUATION ADULT - NSANTHOSAYNRD_GEN_A_CORE
No. LISSET screening performed.  STOP BANG Legend: 0-2 = LOW Risk; 3-4 = INTERMEDIATE Risk; 5-8 = HIGH Risk

## 2023-02-28 LAB — SURGICAL PATHOLOGY STUDY: SIGNIFICANT CHANGE UP

## 2023-03-01 ENCOUNTER — APPOINTMENT (OUTPATIENT)
Dept: NEUROLOGY | Facility: CLINIC | Age: 48
End: 2023-03-01

## 2023-03-02 DIAGNOSIS — K21.00 GASTRO-ESOPHAGEAL REFLUX DISEASE WITH ESOPHAGITIS, WITHOUT BLEEDING: ICD-10-CM

## 2023-03-02 DIAGNOSIS — F41.9 ANXIETY DISORDER, UNSPECIFIED: ICD-10-CM

## 2023-03-02 DIAGNOSIS — F41.0 PANIC DISORDER [EPISODIC PAROXYSMAL ANXIETY]: ICD-10-CM

## 2023-03-02 DIAGNOSIS — Z01.818 ENCOUNTER FOR OTHER PREPROCEDURAL EXAMINATION: ICD-10-CM

## 2023-03-02 DIAGNOSIS — E55.9 VITAMIN D DEFICIENCY, UNSPECIFIED: ICD-10-CM

## 2023-03-02 DIAGNOSIS — E66.01 MORBID (SEVERE) OBESITY DUE TO EXCESS CALORIES: ICD-10-CM

## 2023-03-02 DIAGNOSIS — K44.9 DIAPHRAGMATIC HERNIA WITHOUT OBSTRUCTION OR GANGRENE: ICD-10-CM

## 2023-03-02 DIAGNOSIS — E78.00 PURE HYPERCHOLESTEROLEMIA, UNSPECIFIED: ICD-10-CM

## 2023-03-02 DIAGNOSIS — I10 ESSENTIAL (PRIMARY) HYPERTENSION: ICD-10-CM

## 2023-03-02 DIAGNOSIS — Z86.16 PERSONAL HISTORY OF COVID-19: ICD-10-CM

## 2023-03-02 DIAGNOSIS — M51.06 INTERVERTEBRAL DISC DISORDERS WITH MYELOPATHY, LUMBAR REGION: ICD-10-CM

## 2023-03-02 DIAGNOSIS — Z87.891 PERSONAL HISTORY OF NICOTINE DEPENDENCE: ICD-10-CM

## 2023-03-03 ENCOUNTER — NON-APPOINTMENT (OUTPATIENT)
Age: 48
End: 2023-03-03

## 2023-03-11 ENCOUNTER — APPOINTMENT (OUTPATIENT)
Dept: SLEEP CENTER | Facility: HOME HEALTH | Age: 48
End: 2023-03-11
Payer: COMMERCIAL

## 2023-03-11 ENCOUNTER — OUTPATIENT (OUTPATIENT)
Dept: OUTPATIENT SERVICES | Facility: HOSPITAL | Age: 48
LOS: 1 days | End: 2023-03-11
Payer: COMMERCIAL

## 2023-03-11 PROCEDURE — 95800 SLP STDY UNATTENDED: CPT | Mod: 26

## 2023-03-11 PROCEDURE — 95800 SLP STDY UNATTENDED: CPT

## 2023-03-13 ENCOUNTER — APPOINTMENT (OUTPATIENT)
Dept: OTOLARYNGOLOGY | Facility: CLINIC | Age: 48
End: 2023-03-13
Payer: COMMERCIAL

## 2023-03-13 PROCEDURE — 92550 TYMPANOMETRY & REFLEX THRESH: CPT | Mod: 52

## 2023-03-13 PROCEDURE — 92653 AEP NEURODIAGNOSTIC I&R: CPT

## 2023-03-13 PROCEDURE — 92557 COMPREHENSIVE HEARING TEST: CPT

## 2023-03-14 ENCOUNTER — APPOINTMENT (OUTPATIENT)
Dept: PULMONOLOGY | Facility: CLINIC | Age: 48
End: 2023-03-14

## 2023-03-14 DIAGNOSIS — G47.33 OBSTRUCTIVE SLEEP APNEA (ADULT) (PEDIATRIC): ICD-10-CM

## 2023-03-15 ENCOUNTER — APPOINTMENT (OUTPATIENT)
Dept: OTOLARYNGOLOGY | Facility: CLINIC | Age: 48
End: 2023-03-15
Payer: COMMERCIAL

## 2023-03-15 VITALS
HEART RATE: 76 BPM | TEMPERATURE: 97 F | OXYGEN SATURATION: 100 % | RESPIRATION RATE: 17 BRPM | DIASTOLIC BLOOD PRESSURE: 90 MMHG | SYSTOLIC BLOOD PRESSURE: 134 MMHG

## 2023-03-15 DIAGNOSIS — H81.13 BENIGN PAROXYSMAL VERTIGO, BILATERAL: ICD-10-CM

## 2023-03-15 DIAGNOSIS — R42 DIZZINESS AND GIDDINESS: ICD-10-CM

## 2023-03-15 DIAGNOSIS — Z87.898 PERSONAL HISTORY OF OTHER SPECIFIED CONDITIONS: ICD-10-CM

## 2023-03-15 DIAGNOSIS — H93.12 TINNITUS, LEFT EAR: ICD-10-CM

## 2023-03-15 DIAGNOSIS — H93.A3 PULSATILE TINNITUS, BILATERAL: ICD-10-CM

## 2023-03-15 DIAGNOSIS — H93.8X2 OTHER SPECIFIED DISORDERS OF LEFT EAR: ICD-10-CM

## 2023-03-15 PROCEDURE — 99213 OFFICE O/P EST LOW 20 MIN: CPT

## 2023-03-15 NOTE — PHYSICAL EXAM
[] : septum deviated bilaterally [Normal] : no rashes [de-identified] :  Deviated Nasal Septum to the right side.  Turbinate Hypertroph, Lingual Tonsil Hypertrophy,

## 2023-03-15 NOTE — HISTORY OF PRESENT ILLNESS
[de-identified] : 47 years old female patient with history of persistent dizziness for the past couple days.  Patient is present today in the office with Positional Vertigo.  Bilateral Tinnitus.  History of Obstructive Sleep Apnea. Sleep disturbance

## 2023-03-15 NOTE — REVIEW OF SYSTEMS
[Patient Intake Form Reviewed] : Patient intake form was reviewed [Vertigo] : vertigo [Ear Noises] : ear noises [As Noted in HPI] : as noted in HPI [Dizziness] : dizziness [Negative] : Heme/Lymph

## 2023-03-15 NOTE — REASON FOR VISIT
[Subsequent Evaluation] : a subsequent evaluation for [FreeTextEntry2] : persistent dizziness for the past couple days

## 2023-03-27 ENCOUNTER — APPOINTMENT (OUTPATIENT)
Dept: PULMONOLOGY | Facility: CLINIC | Age: 48
End: 2023-03-27
Payer: COMMERCIAL

## 2023-03-27 PROCEDURE — 99214 OFFICE O/P EST MOD 30 MIN: CPT | Mod: 95

## 2023-03-27 NOTE — HISTORY OF PRESENT ILLNESS
[Home] : at home, [unfilled] , at the time of the visit. [Medical Office: ([facility])___] : at the medical office located in  [Verbal consent obtained from patient] : the patient, [unfilled] [Never] : never [TextBox_4] : 47 year old female, never smoker with h/o childhood asthma was referred to pulmonary clinic by Dr. Monahan for pulmonary evaluation before bariatric surgery\par Patient denies cough, SOB, chest pain or pulmonary illness in last 1 year. She is climb to three flights of stairs without stopping.  Patient is also c/o snoring but no witnessed apnea, early morning headache or choking at night time or excessive daytime sleepiness. ESS score is 8.\par \par 3/27/23:\par Sleep study, PFT and CXR were done. No new pulmonary symptom.  [ESS] : 8

## 2023-03-27 NOTE — CONSULT LETTER
[Dear  ___] : Dear  [unfilled], [Consult Letter:] : I had the pleasure of evaluating your patient, [unfilled]. [Please see my note below.] : Please see my note below. [Consult Closing:] : Thank you very much for allowing me to participate in the care of this patient.  If you have any questions, please do not hesitate to contact me. [FreeTextEntry3] : Sincerely\par \par Enrrique Cool MD [facility]\par , [facility] [employer]\par Associate , Pulmonary and Critical Care Fellowship\par Pulmonary and Critical Care\par [facility]\par Phone: [phone]\par

## 2023-03-27 NOTE — DISCUSSION/SUMMARY
[FreeTextEntry1] : 47 year old female, never smoker with h/o childhood asthma was referred to pulmonary clinic by Dr. Monhaan for pulmonary evaluation before bariatric surgery.\par \par Review:\par - Labs\par - Bariatric surgery notes\par - Sleep study: Mild sleep apnea\par - PFT: Normal spirometry with normal diffusion capacity. \par - CXR: NAD\par \par A/P\par Pre-Op Pulmonary examination:\par Patient has good functional status. She denied any pulmonary symptoms. She denied history of previous pulmonary illness or admission to hospital for pulmonary illness. Pulmonary exam was normal with no hypoxia on ambulation. CXR did not reveal any acute parenchymal abnormality. Sleep study showed mild sleep apnea.\par Patient may undergo bariatric surgery. Her ARISCAT score 31. She has low risk (1.6% risk) of in-hospital post-op pulmonary complications (composite including respiratory failure, respiratory infection, pleural effusion, atelectasis, pneumothorax, bronchospasm, aspiration pneumonitis).\par Recommendation:\par - Avoid Opioids during surgery\par - Extubate in upright position\par - Would recommend to extubate once patient is awake\par - BIPAP may be required after extubation\par

## 2023-03-29 ENCOUNTER — NON-APPOINTMENT (OUTPATIENT)
Age: 48
End: 2023-03-29

## 2023-03-29 VITALS — BODY MASS INDEX: 35.65 KG/M2 | WEIGHT: 214 LBS | HEIGHT: 65 IN

## 2023-03-30 ENCOUNTER — APPOINTMENT (OUTPATIENT)
Dept: BARIATRICS | Facility: CLINIC | Age: 48
End: 2023-03-30

## 2023-04-05 ENCOUNTER — APPOINTMENT (OUTPATIENT)
Dept: BARIATRICS | Facility: CLINIC | Age: 48
End: 2023-04-05
Payer: COMMERCIAL

## 2023-04-05 VITALS
DIASTOLIC BLOOD PRESSURE: 77 MMHG | WEIGHT: 213 LBS | HEART RATE: 69 BPM | BODY MASS INDEX: 35.49 KG/M2 | HEIGHT: 65 IN | OXYGEN SATURATION: 97 % | TEMPERATURE: 97.3 F | SYSTOLIC BLOOD PRESSURE: 127 MMHG

## 2023-04-05 PROCEDURE — 99214 OFFICE O/P EST MOD 30 MIN: CPT

## 2023-04-05 NOTE — ASSESSMENT
[FreeTextEntry1] : It was my pleasure to interact with Ms. ELOISE FLOREZ today. In summary, Ms. ELOISE FLOREZ has morbid obesity refractory to medical and dietary efforts for which She could benefit from weight loss surgery.  We discussed in detail the sleeve gastrectomy. She understood that these procedures are done primarily minimally invasively (laparoscopically or robotically), but that there is a possibility of conversion to laparotomy. In this particular case, with their body mass index we discussed realistic expectations with respect to weight loss.  Approximately 50% of excess weight will be lost with sleeve gastrectomy.  In order to maintain weight loss or lose more weight, She will be required to modify diet and exercise habits (the "tool" concept of weight loss surgery).  We discussed the necessity for continuous, life-long medical care following surgery and the necessity for taking mineral and vitamin supplements daily for the rest of life. We discussed the importance of high protein diet and daily exercise for maximal success.\par \par We discussed complications that may follow bariatric surgery that include, but are not limited to, respiratory problems, pneumonia, thrombophlebitis, and pulmonary embolus.  We also discussed intra-abdominal complications including anastomotic leak, intra-abdominal infections, portal vein thrombosis and death that may result from bariatric surgery. With respect to sleeve gastrectomy we discussed the chances of having refractory GERD that may require intervention in the future including conversion to gastric bypass. We also discussed postoperative DVT prophylaxis to decrease the incidence of deep vein thrombosis when indicated. \par \par The prolonged discussion centered primarily on the indications for surgery and evaluation of the risk/benefit ratio for Ms. ELOISE FLOREZ and other weight loss alternatives. I answered all questions about bariatric surgery and possible complications to the best of my ability.\par \par Once the preoperative evaluation is complete, I will review the chart and schedule the patient for a follow-up visit in order to answer any questions prior to scheduling surgery.\par \par Plan:\par robotic assisted sleeve gastrectomy with possible paraesophageal hiatal hernia repair\par Confirm preoperative risk assessment/work-up/clearances\par Upper endoscopy and Bravo study reviewed\par \par I, Dr. Hermelinda Monahan, spent 35 minutes with the patient >50% counseling/coordination of care including, reviewing the patient's history, performing an examination, reviewing relevant labs and radiographic imaging, reviewing PCP and consultant notes, discussion of medical and surgical management of the diagnosis as well as associated risks and benefits, and completing documentation.\par

## 2023-04-05 NOTE — HISTORY OF PRESENT ILLNESS
[de-identified] : Patient is a 47 year old woman with history of morbid obesity (BMI 35), COVID, pneumonia, joint pain, anxiety, palpitations, previous abdominoplasty who presents for final visit prior to bariatric surgery. The patient has been overweight for more than 5 years and has tried multiple diet regimens which resulted in weight loss that was regained in the months following the diet. The patient has also tried fad diets to lose weight without success. Underwent appropriate pre-operative workup and clearances. Upper endoscopy demonstrated small hiatal hernia, Bravo pH study negative for reflux.

## 2023-04-10 LAB
25(OH)D3 SERPL-MCNC: 22.3 NG/ML
A-TOCOPHEROL VIT E SERPL-MCNC: 17 MG/L
ALBUMIN SERPL ELPH-MCNC: 4.5 G/DL
ALP BLD-CCNC: 62 U/L
ALT SERPL-CCNC: 34 U/L
ANION GAP SERPL CALC-SCNC: 14 MMOL/L
AST SERPL-CCNC: 28 U/L
BASOPHILS # BLD AUTO: 0.03 K/UL
BASOPHILS NFR BLD AUTO: 0.4 %
BETA+GAMMA TOCOPHEROL SERPL-MCNC: 1.8 MG/L
BILIRUB SERPL-MCNC: 0.2 MG/DL
BUN SERPL-MCNC: 13 MG/DL
CA-I SERPL-SCNC: 5 MG/DL
CALCIUM SERPL-MCNC: 9.5 MG/DL
CALCIUM SERPL-MCNC: 9.5 MG/DL
CHLORIDE SERPL-SCNC: 102 MMOL/L
CHOLEST SERPL-MCNC: 256 MG/DL
CO2 SERPL-SCNC: 22 MMOL/L
CREAT SERPL-MCNC: 0.68 MG/DL
EGFR: 109 ML/MIN/1.73M2
EOSINOPHIL # BLD AUTO: 0.07 K/UL
EOSINOPHIL NFR BLD AUTO: 1 %
ESTIMATED AVERAGE GLUCOSE: 105 MG/DL
FOLATE SERPL-MCNC: 8.9 NG/ML
GLUCOSE SERPL-MCNC: 86 MG/DL
HBA1C MFR BLD HPLC: 5.3 %
HCG SERPL QL: NEGATIVE
HCT VFR BLD CALC: 37.4 %
HDLC SERPL-MCNC: 41 MG/DL
HGB BLD-MCNC: 12.5 G/DL
IMM GRANULOCYTES NFR BLD AUTO: 0.1 %
INR PPP: 0.98 RATIO
IRON SATN MFR SERPL: 21 %
IRON SERPL-MCNC: 72 UG/DL
LDLC SERPL CALC-MCNC: 146 MG/DL
LYMPHOCYTES # BLD AUTO: 2.13 K/UL
LYMPHOCYTES NFR BLD AUTO: 30.4 %
MAN DIFF?: NORMAL
MCHC RBC-ENTMCNC: 30.7 PG
MCHC RBC-ENTMCNC: 33.4 GM/DL
MCV RBC AUTO: 91.9 FL
MONOCYTES # BLD AUTO: 0.62 K/UL
MONOCYTES NFR BLD AUTO: 8.8 %
NEUTROPHILS # BLD AUTO: 4.15 K/UL
NEUTROPHILS NFR BLD AUTO: 59.3 %
NONHDLC SERPL-MCNC: 215 MG/DL
PAPP-A SERPL-ACNC: <1 MIU/ML
PARATHYROID HORMONE INTACT: 54 PG/ML
PLATELET # BLD AUTO: 196 K/UL
POTASSIUM SERPL-SCNC: 4.2 MMOL/L
PREALB SERPL NEPH-MCNC: 25 MG/DL
PROT SERPL-MCNC: 7.2 G/DL
PT BLD: 11.4 SEC
RBC # BLD: 4.07 M/UL
RBC # FLD: 13.1 %
SODIUM SERPL-SCNC: 138 MMOL/L
TIBC SERPL-MCNC: 336 UG/DL
TRIGL SERPL-MCNC: 344 MG/DL
TSH SERPL-ACNC: 1.05 UIU/ML
UIBC SERPL-MCNC: 264 UG/DL
UREA BREATH TEST QL: NEGATIVE
VIT A SERPL-MCNC: 46 UG/DL
VIT B1 SERPL-MCNC: 101.1 NMOL/L
VIT B12 SERPL-MCNC: 556 PG/ML
WBC # FLD AUTO: 7.01 K/UL
ZINC SERPL-MCNC: 64 UG/DL

## 2023-04-11 ENCOUNTER — APPOINTMENT (OUTPATIENT)
Dept: HEART AND VASCULAR | Facility: CLINIC | Age: 48
End: 2023-04-11
Payer: COMMERCIAL

## 2023-04-11 ENCOUNTER — NON-APPOINTMENT (OUTPATIENT)
Age: 48
End: 2023-04-11

## 2023-04-11 VITALS
DIASTOLIC BLOOD PRESSURE: 88 MMHG | HEART RATE: 62 BPM | WEIGHT: 214 LBS | BODY MASS INDEX: 35.65 KG/M2 | SYSTOLIC BLOOD PRESSURE: 142 MMHG | HEIGHT: 65 IN

## 2023-04-11 DIAGNOSIS — Z86.39 PERSONAL HISTORY OF OTHER ENDOCRINE, NUTRITIONAL AND METABOLIC DISEASE: ICD-10-CM

## 2023-04-11 DIAGNOSIS — R09.89 OTHER SPECIFIED SYMPTOMS AND SIGNS INVOLVING THE CIRCULATORY AND RESPIRATORY SYSTEMS: ICD-10-CM

## 2023-04-11 PROCEDURE — 99214 OFFICE O/P EST MOD 30 MIN: CPT | Mod: 95

## 2023-04-11 NOTE — HISTORY OF PRESENT ILLNESS
[Home] : at home, [unfilled] , at the time of the visit. [Other Location: e.g. Home (Enter Location, City,State)___] : at [unfilled] [Verbal consent obtained from patient] : the patient, [unfilled] [FreeTextEntry1] : The patient is scheduled for sleeve gastrectomy and hiatal hernia repair on April 21 with Dr. Monahan.  She continues to have chest discomfort after eating red meat.  She feels bloated with burning in her throat.  She reports a Bravo test for reflux okay.  Was diagnosed with mild sleep apnea.  Her blood pressure at home is typically 130/70.  She walks greater than 20 blocks without difficulty.  She has dyspnea on the stairs but does not stop.

## 2023-04-11 NOTE — DISCUSSION/SUMMARY
[FreeTextEntry1] : Patient has had atypical chest discomfort.  She has hypercholesterolemia.  She will come in for an exercise test.  She is scheduled for bariatric surgery.  Her surgical risk will be provided after her stress test is complete.  We will attempt to improve her diet.  Bariatric surgery will very likely help her hypercholesterolemia.  Laboratory testing has already been ordered.

## 2023-04-13 ENCOUNTER — LABORATORY RESULT (OUTPATIENT)
Age: 48
End: 2023-04-13

## 2023-04-14 ENCOUNTER — APPOINTMENT (OUTPATIENT)
Dept: HEART AND VASCULAR | Facility: CLINIC | Age: 48
End: 2023-04-14
Payer: COMMERCIAL

## 2023-04-14 VITALS
HEIGHT: 65 IN | BODY MASS INDEX: 35.65 KG/M2 | DIASTOLIC BLOOD PRESSURE: 84 MMHG | WEIGHT: 214 LBS | HEART RATE: 72 BPM | SYSTOLIC BLOOD PRESSURE: 130 MMHG

## 2023-04-14 DIAGNOSIS — Z01.818 ENCOUNTER FOR OTHER PREPROCEDURAL EXAMINATION: ICD-10-CM

## 2023-04-14 DIAGNOSIS — R06.09 OTHER FORMS OF DYSPNEA: ICD-10-CM

## 2023-04-14 PROCEDURE — 99213 OFFICE O/P EST LOW 20 MIN: CPT | Mod: 25

## 2023-04-14 PROCEDURE — 93351 STRESS TTE COMPLETE: CPT

## 2023-04-17 PROBLEM — R06.09 DYSPNEA ON EXERTION: Status: ACTIVE | Noted: 2023-04-11

## 2023-04-17 PROBLEM — Z01.818 PREOPERATIVE CLEARANCE: Status: ACTIVE | Noted: 2017-05-24

## 2023-04-17 NOTE — DISCUSSION/SUMMARY
[FreeTextEntry1] : The patient has dyspnea on exertion.  Stress echocardiogram was normal.  She is scheduled for a laparoscopic sleeve gastrectomy with a possible hiatal hernia surgery.  This is a low risk procedure.  Her RCRI score is 0.  She is low risk.  She is optimized for surgery.

## 2023-04-17 NOTE — REVIEW OF SYSTEMS
[Dyspnea on exertion] : dyspnea during exertion [Chest Discomfort] : chest discomfort [Heartburn] : heartburn [Abdominal Pain] : abdominal pain [Negative] : Heme/Lymph

## 2023-04-20 ENCOUNTER — TRANSCRIPTION ENCOUNTER (OUTPATIENT)
Age: 48
End: 2023-04-20

## 2023-04-20 VITALS
TEMPERATURE: 97 F | OXYGEN SATURATION: 100 % | HEART RATE: 69 BPM | HEIGHT: 65 IN | WEIGHT: 206.13 LBS | DIASTOLIC BLOOD PRESSURE: 79 MMHG | SYSTOLIC BLOOD PRESSURE: 129 MMHG | RESPIRATION RATE: 19 BRPM

## 2023-04-20 LAB — SARS-COV-2 N GENE NPH QL NAA+PROBE: NOT DETECTED

## 2023-04-20 RX ORDER — RIVAROXABAN 15 MG-20MG
0 KIT ORAL
Qty: 0 | Refills: 0 | DISCHARGE

## 2023-04-20 NOTE — PATIENT PROFILE ADULT - FALL HARM RISK - UNIVERSAL INTERVENTIONS
Bed in lowest position, wheels locked, appropriate side rails in place/Call bell, personal items and telephone in reach/Instruct patient to call for assistance before getting out of bed or chair/Non-slip footwear when patient is out of bed/[place] to call system/Physically safe environment - no spills, clutter or unnecessary equipment/Purposeful Proactive Rounding/Room/bathroom lighting operational, light cord in reach

## 2023-04-21 ENCOUNTER — INPATIENT (INPATIENT)
Facility: HOSPITAL | Age: 48
LOS: 0 days | Discharge: HOME | DRG: 621 | End: 2023-04-22
Attending: STUDENT IN AN ORGANIZED HEALTH CARE EDUCATION/TRAINING PROGRAM | Admitting: STUDENT IN AN ORGANIZED HEALTH CARE EDUCATION/TRAINING PROGRAM
Payer: COMMERCIAL

## 2023-04-21 ENCOUNTER — TRANSCRIPTION ENCOUNTER (OUTPATIENT)
Age: 48
End: 2023-04-21

## 2023-04-21 ENCOUNTER — RESULT REVIEW (OUTPATIENT)
Age: 48
End: 2023-04-21

## 2023-04-21 ENCOUNTER — APPOINTMENT (OUTPATIENT)
Dept: BARIATRICS | Facility: HOSPITAL | Age: 48
End: 2023-04-21

## 2023-04-21 DIAGNOSIS — Z98.890 OTHER SPECIFIED POSTPROCEDURAL STATES: Chronic | ICD-10-CM

## 2023-04-21 LAB
BLD GP AB SCN SERPL QL: NEGATIVE — SIGNIFICANT CHANGE UP
HCT VFR BLD CALC: 36.5 % — SIGNIFICANT CHANGE UP (ref 34.5–45)
HGB BLD-MCNC: 12.5 G/DL — SIGNIFICANT CHANGE UP (ref 11.5–15.5)
MCHC RBC-ENTMCNC: 30.9 PG — SIGNIFICANT CHANGE UP (ref 27–34)
MCHC RBC-ENTMCNC: 34.2 GM/DL — SIGNIFICANT CHANGE UP (ref 32–36)
MCV RBC AUTO: 90.1 FL — SIGNIFICANT CHANGE UP (ref 80–100)
NRBC # BLD: 0 /100 WBCS — SIGNIFICANT CHANGE UP (ref 0–0)
PLATELET # BLD AUTO: 228 K/UL — SIGNIFICANT CHANGE UP (ref 150–400)
RBC # BLD: 4.05 M/UL — SIGNIFICANT CHANGE UP (ref 3.8–5.2)
RBC # FLD: 12.1 % — SIGNIFICANT CHANGE UP (ref 10.3–14.5)
RH IG SCN BLD-IMP: POSITIVE — SIGNIFICANT CHANGE UP
WBC # BLD: 11.68 K/UL — HIGH (ref 3.8–10.5)
WBC # FLD AUTO: 11.68 K/UL — HIGH (ref 3.8–10.5)

## 2023-04-21 PROCEDURE — 88307 TISSUE EXAM BY PATHOLOGIST: CPT | Mod: 26

## 2023-04-21 PROCEDURE — S2900 ROBOTIC SURGICAL SYSTEM: CPT | Mod: NC

## 2023-04-21 PROCEDURE — 43775 LAP SLEEVE GASTRECTOMY: CPT

## 2023-04-21 DEVICE — ARISTA 3GR: Type: IMPLANTABLE DEVICE | Status: FUNCTIONAL

## 2023-04-21 DEVICE — XI STAPLER SUREFORM RELOAD 60 BLUE: Type: IMPLANTABLE DEVICE | Status: FUNCTIONAL

## 2023-04-21 RX ORDER — PANTOPRAZOLE SODIUM 20 MG/1
40 TABLET, DELAYED RELEASE ORAL EVERY 24 HOURS
Refills: 0 | Status: DISCONTINUED | OUTPATIENT
Start: 2023-04-21 | End: 2023-04-22

## 2023-04-21 RX ORDER — HYOSCYAMINE SULFATE 0.13 MG
0.12 TABLET ORAL EVERY 6 HOURS
Refills: 0 | Status: DISCONTINUED | OUTPATIENT
Start: 2023-04-21 | End: 2023-04-22

## 2023-04-21 RX ORDER — KETOROLAC TROMETHAMINE 30 MG/ML
15 SYRINGE (ML) INJECTION EVERY 6 HOURS
Refills: 0 | Status: DISCONTINUED | OUTPATIENT
Start: 2023-04-21 | End: 2023-04-22

## 2023-04-21 RX ORDER — ENOXAPARIN SODIUM 100 MG/ML
40 INJECTION SUBCUTANEOUS ONCE
Refills: 0 | Status: COMPLETED | OUTPATIENT
Start: 2023-04-21 | End: 2023-04-21

## 2023-04-21 RX ORDER — SODIUM CHLORIDE 9 MG/ML
1000 INJECTION, SOLUTION INTRAVENOUS
Refills: 0 | Status: DISCONTINUED | OUTPATIENT
Start: 2023-04-21 | End: 2023-04-22

## 2023-04-21 RX ORDER — ACETAMINOPHEN 500 MG
1000 TABLET ORAL ONCE
Refills: 0 | Status: COMPLETED | OUTPATIENT
Start: 2023-04-21 | End: 2023-04-21

## 2023-04-21 RX ORDER — ONDANSETRON 8 MG/1
4 TABLET, FILM COATED ORAL EVERY 6 HOURS
Refills: 0 | Status: DISCONTINUED | OUTPATIENT
Start: 2023-04-21 | End: 2023-04-22

## 2023-04-21 RX ORDER — METOCLOPRAMIDE HCL 10 MG
10 TABLET ORAL EVERY 6 HOURS
Refills: 0 | Status: DISCONTINUED | OUTPATIENT
Start: 2023-04-21 | End: 2023-04-22

## 2023-04-21 RX ORDER — HYDROMORPHONE HYDROCHLORIDE 2 MG/ML
0.5 INJECTION INTRAMUSCULAR; INTRAVENOUS; SUBCUTANEOUS
Refills: 0 | Status: DISCONTINUED | OUTPATIENT
Start: 2023-04-21 | End: 2023-04-22

## 2023-04-21 RX ORDER — HEPARIN SODIUM 5000 [USP'U]/ML
5000 INJECTION INTRAVENOUS; SUBCUTANEOUS EVERY 8 HOURS
Refills: 0 | Status: DISCONTINUED | OUTPATIENT
Start: 2023-04-22 | End: 2023-04-22

## 2023-04-21 RX ORDER — THIAMINE MONONITRATE (VIT B1) 100 MG
500 TABLET ORAL DAILY
Refills: 0 | Status: DISCONTINUED | OUTPATIENT
Start: 2023-04-21 | End: 2023-04-22

## 2023-04-21 RX ORDER — SCOPALAMINE 1 MG/3D
1 PATCH, EXTENDED RELEASE TRANSDERMAL ONCE
Refills: 0 | Status: COMPLETED | OUTPATIENT
Start: 2023-04-21 | End: 2023-04-21

## 2023-04-21 RX ORDER — ACETAMINOPHEN 500 MG
650 TABLET ORAL EVERY 6 HOURS
Refills: 0 | Status: DISCONTINUED | OUTPATIENT
Start: 2023-04-21 | End: 2023-04-22

## 2023-04-21 RX ADMIN — Medication 650 MILLIGRAM(S): at 18:25

## 2023-04-21 RX ADMIN — PANTOPRAZOLE SODIUM 40 MILLIGRAM(S): 20 TABLET, DELAYED RELEASE ORAL at 17:40

## 2023-04-21 RX ADMIN — Medication 650 MILLIGRAM(S): at 18:04

## 2023-04-21 RX ADMIN — ENOXAPARIN SODIUM 40 MILLIGRAM(S): 100 INJECTION SUBCUTANEOUS at 10:40

## 2023-04-21 RX ADMIN — Medication 1000 MILLIGRAM(S): at 10:40

## 2023-04-21 RX ADMIN — SODIUM CHLORIDE 135 MILLILITER(S): 9 INJECTION, SOLUTION INTRAVENOUS at 17:56

## 2023-04-21 RX ADMIN — SCOPALAMINE 1 PATCH: 1 PATCH, EXTENDED RELEASE TRANSDERMAL at 10:40

## 2023-04-21 RX ADMIN — ONDANSETRON 4 MILLIGRAM(S): 8 TABLET, FILM COATED ORAL at 21:24

## 2023-04-21 NOTE — H&P ADULT - ASSESSMENT
This is a 47 year old woman with history of morbid obesity (BMI 35), COVID, pneumonia, joint pain, anxiety, palpitations, previous abdominoplasty who presents for bariatric surgery.    - NPO preoperatively  - For Sleeve Gastrectomy & HHR today  - CBC postoperatively and labs in am.   - Remainder of plan pending operative interventions required.   - Admitted to Dr. Monahan under Team 2.

## 2023-04-21 NOTE — H&P ADULT - NSHPPHYSICALEXAM_GEN_ALL_CORE
Eyes: PERRL, EOMI, no conjunctival infection, anicteric.   Neck: supple without JVD, no thyromegaly or masses appreciated.   Pulmonary: clear to auscultation bilaterally, no dullness, no wheezing.   Cardiac: RRR, normal S1S2, no murmurs, rubs, gallops.   Abdomen: normoactive bowel sounds, soft and nontender, no hepatosplenomegaly or masses appreciated. Incisions healing appropriately without erythema or drainage.   Back: normal spine exam without palpable tenderness, no kyphosis or scoliosis.   Musculoskeletal: full range of motion and no deformities appreciated.   Skin: normal appearance, no rash, nodules, vesicles, ulcers, erythema.   Neurology: grossly intact.   Psychiatric: affect appropriate.

## 2023-04-21 NOTE — H&P ADULT - NSICDXPASTMEDICALHX_GEN_ALL_CORE_FT
PAST MEDICAL HISTORY:  2019 novel coronavirus disease (COVID-19) short term memory loss due to COVID    HLD (hyperlipidemia)     Sickle cell trait

## 2023-04-21 NOTE — BRIEF OPERATIVE NOTE - NSICDXBRIEFPREOP_GEN_ALL_CORE_FT
PRE-OP DIAGNOSIS:  Morbid obesity 21-Apr-2023 16:13:46  Eloy Lawson  Hiatal hernia 21-Apr-2023 16:13:52 Suspected on EGD Eloy Lawson

## 2023-04-21 NOTE — PROGRESS NOTE ADULT - SUBJECTIVE AND OBJECTIVE BOX
Procedure: VICK LSG  Surgeon: Dr. Monahan    S: Pt endorses itchy scalp, no other acute complaints at this time. Praful min sips without nausea or vomiting. Pain controlled with medication. Denies CP, SOB, LUO, calf tenderness.     O:  T(C): 36.5 (04-21-23 @ 16:55), Max: 36.5 (04-21-23 @ 16:55)  T(F): 97.7 (04-21-23 @ 16:55), Max: 97.7 (04-21-23 @ 16:55)  HR: 72 (04-21-23 @ 18:25) (66 - 76)  BP: 140/75 (04-21-23 @ 18:25) (132/69 - 147/81)  RR: 14 (04-21-23 @ 18:25) (13 - 18)  SpO2: 96% (04-21-23 @ 18:25) (95% - 96%)  Wt(kg): --            Gen: NAD, resting comfortably in bed  C/V: NSR  Pulm: Nonlabored breathing, no respiratory distress, NC  Abd: Obese, soft, appropriately ttp near incisional site. no rebound or guarding  Incision; c/d/i  Extrem: WWP, no calf edema, SCDs in place

## 2023-04-21 NOTE — H&P ADULT - HISTORY OF PRESENT ILLNESS
Patient is a 47 year old woman with history of morbid obesity (BMI 35), COVID, pneumonia, joint pain, anxiety, palpitations, previous abdominoplasty who presents for bariatric surgery. The patient has been overweight for more than 5 years and has tried multiple diet regimens which resulted in weight loss that was regained in the months following the diet. The patient has also tried fad diets to lose weight without success. Underwent appropriate pre-operative workup and clearances. Upper endoscopy demonstrated small hiatal hernia, Bravo pH study negative for reflux.     Preoperative addendum  - REports to hospital without acute concerns

## 2023-04-21 NOTE — BRIEF OPERATIVE NOTE - OPERATION/FINDINGS
Access obtained with Veress technique in LUQ. A 12 mm port was placed in supraumbilical area. A 8 mm port was placed on right and two 8 mm ports were placed on left. An additional 5 mm port was placed in the RUQ. Stomach was visualized and gastrocolic ligament taken down without complication. The hiatus was explored by taking down the gastrohepatic ligament proximally but no hiatal hernia was found.    A 40F Bougie was advanced and the robotic stapler was used to create the sleeve gastrectomy with blue loads as needed. The staple line was oversewn in a running fashion. The 12 mm port fascia was closed with the suture passer. Skin was closed and skin further apposed with Dermabond.

## 2023-04-21 NOTE — PROGRESS NOTE ADULT - ASSESSMENT
47 year old woman with history of MO (BMI 35), COVID, PNA, joint pain, anxiety, palpitations, previous abdominoplasty S/P RALSG 4/21. POC WNL     Juan CLD  RL at 135  Thiamine x 2 dose  Ondansetron/Metoclopramide PRN  SQH 4/22 am  Post op labs  TOV   Ketoconazole shampoo

## 2023-04-22 ENCOUNTER — TRANSCRIPTION ENCOUNTER (OUTPATIENT)
Age: 48
End: 2023-04-22

## 2023-04-22 VITALS
HEART RATE: 75 BPM | SYSTOLIC BLOOD PRESSURE: 130 MMHG | DIASTOLIC BLOOD PRESSURE: 89 MMHG | RESPIRATION RATE: 17 BRPM | TEMPERATURE: 98 F | OXYGEN SATURATION: 98 %

## 2023-04-22 LAB
ANION GAP SERPL CALC-SCNC: 9 MMOL/L — SIGNIFICANT CHANGE UP (ref 5–17)
BUN SERPL-MCNC: 8 MG/DL — SIGNIFICANT CHANGE UP (ref 7–23)
CALCIUM SERPL-MCNC: 8.6 MG/DL — SIGNIFICANT CHANGE UP (ref 8.4–10.5)
CHLORIDE SERPL-SCNC: 103 MMOL/L — SIGNIFICANT CHANGE UP (ref 96–108)
CO2 SERPL-SCNC: 24 MMOL/L — SIGNIFICANT CHANGE UP (ref 22–31)
CREAT SERPL-MCNC: 0.66 MG/DL — SIGNIFICANT CHANGE UP (ref 0.5–1.3)
EGFR: 109 ML/MIN/1.73M2 — SIGNIFICANT CHANGE UP
GLUCOSE SERPL-MCNC: 113 MG/DL — HIGH (ref 70–99)
HCT VFR BLD CALC: 33 % — LOW (ref 34.5–45)
HGB BLD-MCNC: 11.3 G/DL — LOW (ref 11.5–15.5)
MAGNESIUM SERPL-MCNC: 1.9 MG/DL — SIGNIFICANT CHANGE UP (ref 1.6–2.6)
MCHC RBC-ENTMCNC: 30.8 PG — SIGNIFICANT CHANGE UP (ref 27–34)
MCHC RBC-ENTMCNC: 34.2 GM/DL — SIGNIFICANT CHANGE UP (ref 32–36)
MCV RBC AUTO: 89.9 FL — SIGNIFICANT CHANGE UP (ref 80–100)
NRBC # BLD: 0 /100 WBCS — SIGNIFICANT CHANGE UP (ref 0–0)
PHOSPHATE SERPL-MCNC: 3 MG/DL — SIGNIFICANT CHANGE UP (ref 2.5–4.5)
PLATELET # BLD AUTO: 216 K/UL — SIGNIFICANT CHANGE UP (ref 150–400)
POTASSIUM SERPL-MCNC: 4.5 MMOL/L — SIGNIFICANT CHANGE UP (ref 3.5–5.3)
POTASSIUM SERPL-SCNC: 4.5 MMOL/L — SIGNIFICANT CHANGE UP (ref 3.5–5.3)
RBC # BLD: 3.67 M/UL — LOW (ref 3.8–5.2)
RBC # FLD: 12.4 % — SIGNIFICANT CHANGE UP (ref 10.3–14.5)
SODIUM SERPL-SCNC: 136 MMOL/L — SIGNIFICANT CHANGE UP (ref 135–145)
WBC # BLD: 10.6 K/UL — HIGH (ref 3.8–10.5)
WBC # FLD AUTO: 10.6 K/UL — HIGH (ref 3.8–10.5)

## 2023-04-22 PROCEDURE — 86901 BLOOD TYPING SEROLOGIC RH(D): CPT

## 2023-04-22 PROCEDURE — C1889: CPT

## 2023-04-22 PROCEDURE — S2900: CPT

## 2023-04-22 PROCEDURE — 80048 BASIC METABOLIC PNL TOTAL CA: CPT

## 2023-04-22 PROCEDURE — 86850 RBC ANTIBODY SCREEN: CPT

## 2023-04-22 PROCEDURE — 84100 ASSAY OF PHOSPHORUS: CPT

## 2023-04-22 PROCEDURE — 36415 COLL VENOUS BLD VENIPUNCTURE: CPT

## 2023-04-22 PROCEDURE — 88307 TISSUE EXAM BY PATHOLOGIST: CPT

## 2023-04-22 PROCEDURE — 86900 BLOOD TYPING SEROLOGIC ABO: CPT

## 2023-04-22 PROCEDURE — 83735 ASSAY OF MAGNESIUM: CPT

## 2023-04-22 PROCEDURE — 85027 COMPLETE CBC AUTOMATED: CPT

## 2023-04-22 RX ORDER — HYOSCYAMINE SULFATE 0.13 MG
1 TABLET ORAL
Qty: 56 | Refills: 0
Start: 2023-04-22 | End: 2023-05-05

## 2023-04-22 RX ORDER — OMEPRAZOLE 10 MG/1
1 CAPSULE, DELAYED RELEASE ORAL
Qty: 40 | Refills: 0
Start: 2023-04-22

## 2023-04-22 RX ORDER — ACETAMINOPHEN 500 MG
1000 TABLET ORAL ONCE
Refills: 0 | Status: COMPLETED | OUTPATIENT
Start: 2023-04-22 | End: 2023-04-22

## 2023-04-22 RX ORDER — ONDANSETRON 8 MG/1
1 TABLET, FILM COATED ORAL
Qty: 60 | Refills: 0
Start: 2023-04-22 | End: 2023-05-06

## 2023-04-22 RX ORDER — ASPIRIN/CALCIUM CARB/MAGNESIUM 324 MG
1 TABLET ORAL
Qty: 30 | Refills: 0
Start: 2023-04-22 | End: 2023-05-21

## 2023-04-22 RX ADMIN — Medication 15 MILLIGRAM(S): at 12:02

## 2023-04-22 RX ADMIN — Medication 400 MILLIGRAM(S): at 06:00

## 2023-04-22 RX ADMIN — Medication 1000 MILLIGRAM(S): at 06:30

## 2023-04-22 RX ADMIN — Medication 650 MILLIGRAM(S): at 18:13

## 2023-04-22 RX ADMIN — SCOPALAMINE 1 PATCH: 1 PATCH, EXTENDED RELEASE TRANSDERMAL at 19:06

## 2023-04-22 RX ADMIN — Medication 15 MILLIGRAM(S): at 06:00

## 2023-04-22 RX ADMIN — PANTOPRAZOLE SODIUM 40 MILLIGRAM(S): 20 TABLET, DELAYED RELEASE ORAL at 18:13

## 2023-04-22 RX ADMIN — Medication 650 MILLIGRAM(S): at 00:14

## 2023-04-22 RX ADMIN — SCOPALAMINE 1 PATCH: 1 PATCH, EXTENDED RELEASE TRANSDERMAL at 06:01

## 2023-04-22 RX ADMIN — Medication 650 MILLIGRAM(S): at 12:02

## 2023-04-22 RX ADMIN — Medication 15 MILLIGRAM(S): at 18:13

## 2023-04-22 RX ADMIN — HEPARIN SODIUM 5000 UNIT(S): 5000 INJECTION INTRAVENOUS; SUBCUTANEOUS at 09:18

## 2023-04-22 RX ADMIN — Medication 15 MILLIGRAM(S): at 00:35

## 2023-04-22 RX ADMIN — HEPARIN SODIUM 5000 UNIT(S): 5000 INJECTION INTRAVENOUS; SUBCUTANEOUS at 18:13

## 2023-04-22 RX ADMIN — Medication 105 MILLIGRAM(S): at 12:02

## 2023-04-22 RX ADMIN — SODIUM CHLORIDE 135 MILLILITER(S): 9 INJECTION, SOLUTION INTRAVENOUS at 06:00

## 2023-04-22 NOTE — DISCHARGE NOTE PROVIDER - NSDCCPTREATMENT_GEN_ALL_CORE_FT
PRINCIPAL PROCEDURE  Procedure: Robot-assisted sleeve gastrectomy  Findings and Treatment: hiatal hernia

## 2023-04-22 NOTE — PROGRESS NOTE ADULT - SUBJECTIVE AND OBJECTIVE BOX
SUBJECTIVE: Seen and evaluated in AM. LUZMARIAEON.       MEDICATIONS  (STANDING):  acetaminophen   Oral Liquid .. 650 milliGRAM(s) Oral every 6 hours  heparin   Injectable 5000 Unit(s) SubCutaneous every 8 hours  ketorolac   Injectable 15 milliGRAM(s) IV Push every 6 hours  lactated ringers. 1000 milliLiter(s) (135 mL/Hr) IV Continuous <Continuous>  pantoprazole  Injectable 40 milliGRAM(s) IV Push every 24 hours  thiamine IVPB 500 milliGRAM(s) IV Intermittent daily    MEDICATIONS  (PRN):  HYDROmorphone  Injectable 0.5 milliGRAM(s) IV Push every 3 hours PRN Severe Pain (7 - 10)  hyoscyamine SL 0.125 milliGRAM(s) SubLingual every 6 hours PRN Gastric Spasms  metoclopramide Injectable 10 milliGRAM(s) IV Push every 6 hours PRN nausea refractory to ondansetron  ondansetron Injectable 4 milliGRAM(s) IV Push every 6 hours PRN Nausea and/or Vomiting      Vital Signs Last 24 Hrs  T(C): 37.2 (22 Apr 2023 04:28), Max: 37.2 (22 Apr 2023 04:28)  T(F): 99 (22 Apr 2023 04:28), Max: 99 (22 Apr 2023 04:28)  HR: 85 (22 Apr 2023 04:28) (66 - 91)  BP: 119/78 (22 Apr 2023 04:28) (114/78 - 147/81)  BP(mean): 101 (21 Apr 2023 18:55) (95 - 109)  RR: 17 (22 Apr 2023 04:28) (13 - 19)  SpO2: 96% (22 Apr 2023 04:28) (94% - 100%)    Parameters below as of 22 Apr 2023 04:28  Patient On (Oxygen Delivery Method): room air  O2 Flow (L/min): 19    Gen: NAD, resting comfortably in bed  C/V: NSR  Pulm: Nonlabored breathing, no respiratory distress, NC  Abd: Obese, soft, appropriately ttp near incisional site. no rebound or guarding  Incision; c/d/i  Extrem: WWP, no calf edema, SCDs in place      I&O's Summary    21 Apr 2023 07:01  -  22 Apr 2023 07:00  --------------------------------------------------------  IN: 2190 mL / OUT: 800 mL / NET: 1390 mL        LABS:                        11.3   10.60 )-----------( 216      ( 22 Apr 2023 05:30 )             33.0               CAPILLARY BLOOD GLUCOSE            RADIOLOGY & ADDITIONAL STUDIES:       SUBJECTIVE: Seen and evaluated in AM. LUZMARIAEON. Resting comfortably, endorses tolerating diet,       MEDICATIONS  (STANDING):  acetaminophen   Oral Liquid .. 650 milliGRAM(s) Oral every 6 hours  heparin   Injectable 5000 Unit(s) SubCutaneous every 8 hours  ketorolac   Injectable 15 milliGRAM(s) IV Push every 6 hours  lactated ringers. 1000 milliLiter(s) (135 mL/Hr) IV Continuous <Continuous>  pantoprazole  Injectable 40 milliGRAM(s) IV Push every 24 hours  thiamine IVPB 500 milliGRAM(s) IV Intermittent daily    MEDICATIONS  (PRN):  HYDROmorphone  Injectable 0.5 milliGRAM(s) IV Push every 3 hours PRN Severe Pain (7 - 10)  hyoscyamine SL 0.125 milliGRAM(s) SubLingual every 6 hours PRN Gastric Spasms  metoclopramide Injectable 10 milliGRAM(s) IV Push every 6 hours PRN nausea refractory to ondansetron  ondansetron Injectable 4 milliGRAM(s) IV Push every 6 hours PRN Nausea and/or Vomiting      Vital Signs Last 24 Hrs  T(C): 37.2 (22 Apr 2023 04:28), Max: 37.2 (22 Apr 2023 04:28)  T(F): 99 (22 Apr 2023 04:28), Max: 99 (22 Apr 2023 04:28)  HR: 85 (22 Apr 2023 04:28) (66 - 91)  BP: 119/78 (22 Apr 2023 04:28) (114/78 - 147/81)  BP(mean): 101 (21 Apr 2023 18:55) (95 - 109)  RR: 17 (22 Apr 2023 04:28) (13 - 19)  SpO2: 96% (22 Apr 2023 04:28) (94% - 100%)    Parameters below as of 22 Apr 2023 04:28  Patient On (Oxygen Delivery Method): room air  O2 Flow (L/min): 19    Gen: NAD, resting comfortably in bed  C/V: NSR  Pulm: Nonlabored breathing, no respiratory distress, NC  Abd: Obese, soft, appropriately ttp near incisional site. no rebound or guarding  Incision; c/d/i  Extrem: WWP, no calf edema, SCDs in place      I&O's Summary    21 Apr 2023 07:01 - 22 Apr 2023 07:00  --------------------------------------------------------  IN: 2190 mL / OUT: 800 mL / NET: 1390 mL        LABS:                        11.3   10.60 )-----------( 216 ( 22 Apr 2023 05:30 )             33.0               CAPILLARY BLOOD GLUCOSE            RADIOLOGY & ADDITIONAL STUDIES:       SUBJECTIVE: Seen and evaluated in AM. NAEON. Resting comfortably, endorses tolerating diet, denies nausea, vomiting fever, chills, dysuria.       MEDICATIONS  (STANDING):  acetaminophen   Oral Liquid .. 650 milliGRAM(s) Oral every 6 hours  heparin   Injectable 5000 Unit(s) SubCutaneous every 8 hours  ketorolac   Injectable 15 milliGRAM(s) IV Push every 6 hours  lactated ringers. 1000 milliLiter(s) (135 mL/Hr) IV Continuous <Continuous>  pantoprazole  Injectable 40 milliGRAM(s) IV Push every 24 hours  thiamine IVPB 500 milliGRAM(s) IV Intermittent daily    MEDICATIONS  (PRN):  HYDROmorphone  Injectable 0.5 milliGRAM(s) IV Push every 3 hours PRN Severe Pain (7 - 10)  hyoscyamine SL 0.125 milliGRAM(s) SubLingual every 6 hours PRN Gastric Spasms  metoclopramide Injectable 10 milliGRAM(s) IV Push every 6 hours PRN nausea refractory to ondansetron  ondansetron Injectable 4 milliGRAM(s) IV Push every 6 hours PRN Nausea and/or Vomiting      Vital Signs Last 24 Hrs  T(C): 37.2 (22 Apr 2023 04:28), Max: 37.2 (22 Apr 2023 04:28)  T(F): 99 (22 Apr 2023 04:28), Max: 99 (22 Apr 2023 04:28)  HR: 85 (22 Apr 2023 04:28) (66 - 91)  BP: 119/78 (22 Apr 2023 04:28) (114/78 - 147/81)  BP(mean): 101 (21 Apr 2023 18:55) (95 - 109)  RR: 17 (22 Apr 2023 04:28) (13 - 19)  SpO2: 96% (22 Apr 2023 04:28) (94% - 100%)    Parameters below as of 22 Apr 2023 04:28  Patient On (Oxygen Delivery Method): room air  O2 Flow (L/min): 19    Gen: NAD, resting comfortably in bed  C/V: NSR  Pulm: Nonlabored breathing, no respiratory distress, NC  Abd: Obese, soft, appropriately ttp near incisional site. no rebound or guarding  Incision; c/d/i  Extrem: WWP, no calf edema, SCDs in place      I&O's Summary    21 Apr 2023 07:01  -  22 Apr 2023 07:00  --------------------------------------------------------  IN: 2190 mL / OUT: 800 mL / NET: 1390 mL        LABS:                        11.3   10.60 )-----------( 216      ( 22 Apr 2023 05:30 )             33.0               CAPILLARY BLOOD GLUCOSE            RADIOLOGY & ADDITIONAL STUDIES:

## 2023-04-22 NOTE — DIETITIAN INITIAL EVALUATION ADULT - ADD RECOMMEND
1. BARICLLIQ diet 2. Recommend advance to phase 1 bariatric full liquid diet when medically feasible 3. Encourage adequate hydration with goal of 4oz/hr and/or 64 oz/day 4. Pain and bowel regimen per team 5. Cont to monitor lytes and replete prn

## 2023-04-22 NOTE — PROGRESS NOTE ADULT - ASSESSMENT
47 year old woman with history of MO (BMI 35), COVID, PNA, joint pain, anxiety, palpitations, previous abdominoplasty S/P RALSG 4/21    Juan CLD  RL at 135  Thiamine x 2 dose  Ondansetron/Metoclopramide PRN  SQH 4/22 am  Post op labs  TOV   Ketoconazole shampoo    47 year old woman with history of MO (BMI 35), COVID, PNA, joint pain, anxiety, palpitations, previous abdominoplasty S/P RALSG 4/21    Juan CLD  Ondansetron/Metoclopramide PRN  SQH   Encourage PO, if tolerating then DC later today  IS/OOBA  Follow up with Dr. Monahan in 2 weeks

## 2023-04-22 NOTE — DISCHARGE NOTE PROVIDER - HOSPITAL COURSE
47 year old female with PMH of morbid obesity, joint pain, anxiety, palpitations, previous abdominoplasty presents to [facility] on 4/21 for elective surgery. Taken to the OR on 4/21 for  robotic assisted sleeve gastrectomy and hiatal hernia repair. Transferred to PACU in stable condition. No perioperative complications noted. Diet advanced as tolerated and per return of bowel function. At time of discharge pt is tolerating diet, pain well controlled, pt is ambulating/voiding freely, having adequate bowel function. Pt is HD stable and medically ready for discharge.

## 2023-04-22 NOTE — DISCHARGE NOTE PROVIDER - NSDCFUSCHEDAPPT_GEN_ALL_CORE_FT
Hermelinda Monahan  [facility]  [facility] [address]  Scheduled Appointment: 05/03/2023

## 2023-04-22 NOTE — DIETITIAN INITIAL EVALUATION ADULT - OTHER CALCULATIONS
IBW used for calculations as pt >120% of IBW (165%). Above energy needs calculated for est needs weeks 1-2.   Week 3 advance to wt maintenance 1390-9778 kcal/day (20-25kcal/kg), 68-85g pro/day (1.2-1.5g/kg), >/=64oz clear fluids.

## 2023-04-22 NOTE — DISCHARGE NOTE PROVIDER - NSDCFUADDINST_GEN_ALL_CORE_FT
Follow up with your surgeon in the office in 2 weeks. Please call the office to make appointment.    Activity as tolerated. No strenuous activity/exercise or heavy lifting > 20lbs.    Diet: Bariatric full liquids on discharge. Must intake 60 grams protein daily and 64 fluid ounces daily. Please follow instructions as per booklet given in the office regarding post-op diet regimen.    Take a baby aspirin 81mg daily for one month following surgery.  No NSAIDs (Advil, motrin, aleve, ibuprofen) until approved by your surgeon.   Please take omeprazole capsule 40mg every day.  Please take hyoscyamine 0.125mg sublingual every 6 hours.  Zofran 4mg every 6 hours as needed for nausea, vomiting.   Tyleno 650mg every 6 hours as needed for moderate to severe pain. Do not exceed over 4000mg per day.     Please call the office or go to ER if you begin experiencing increase abdominal pain, nausea, vomiting, temperature > 100.5F.

## 2023-04-22 NOTE — DIETITIAN INITIAL EVALUATION ADULT - OTHER INFO
47F with hx of MO (BMI 35), COVID, PNA, joint pain, anxiety, palpitations, previous abdominoplasty S/P RALSG 4/21, now POD #1. On assessment, pt resting in bed. Currently on BARICLLIQ diet, tolerating PO. Pt had adequate PO intake this morning, consuming 4-5oz prior to 10am. Pain and nausea well controlled. Discussed volumes of various cup sizes on tray table and encouraged aiming for 4 oz/hr as tolerated. Prepared with protein shakes, and vitamins for after discharge. RD provided indepth edu on diet advancement and specific nutrient needs s/p LSG. NKFA. No dietary restrictions at home. Skin: surgical incisions. GI: WDL per flowsheet. RD to follow up per protocol.

## 2023-04-22 NOTE — DIETITIAN INITIAL EVALUATION ADULT - PERTINENT MEDS FT
MEDICATIONS  (STANDING):  acetaminophen   Oral Liquid .. 650 milliGRAM(s) Oral every 6 hours  heparin   Injectable 5000 Unit(s) SubCutaneous every 8 hours  ketorolac   Injectable 15 milliGRAM(s) IV Push every 6 hours  lactated ringers. 1000 milliLiter(s) (135 mL/Hr) IV Continuous <Continuous>  pantoprazole  Injectable 40 milliGRAM(s) IV Push every 24 hours  thiamine IVPB 500 milliGRAM(s) IV Intermittent daily    MEDICATIONS  (PRN):  HYDROmorphone  Injectable 0.5 milliGRAM(s) IV Push every 3 hours PRN Severe Pain (7 - 10)  hyoscyamine SL 0.125 milliGRAM(s) SubLingual every 6 hours PRN Gastric Spasms  metoclopramide Injectable 10 milliGRAM(s) IV Push every 6 hours PRN nausea refractory to ondansetron  ondansetron Injectable 4 milliGRAM(s) IV Push every 6 hours PRN Nausea and/or Vomiting

## 2023-04-22 NOTE — DIETITIAN INITIAL EVALUATION ADULT - PERTINENT LABORATORY DATA
04-22    136  |  103  |  8   ----------------------------<  113<H>  4.5   |  24  |  0.66    Ca    8.6      22 Apr 2023 05:30  Phos  3.0     04-22  Mg     1.9     04-22

## 2023-04-22 NOTE — DISCHARGE NOTE NURSING/CASE MANAGEMENT/SOCIAL WORK - PATIENT PORTAL LINK FT
You can access the FollowMyHealth Patient Portal offered by [facility] by registering at the following website: [email or web address]. By joining [employer]’s FollowMyHealth portal, you will also be able to view your health information using other applications (apps) compatible with our system.

## 2023-04-22 NOTE — DISCHARGE NOTE PROVIDER - NSDCCPCAREPLAN_GEN_ALL_CORE_FT
PRINCIPAL DISCHARGE DIAGNOSIS  Diagnosis: S/P hernia surgery  Assessment and Plan of Treatment: obesity

## 2023-04-22 NOTE — DIETITIAN INITIAL EVALUATION ADULT - NS FNS DIET ORDER
Diet, Clear Liquid:   Bariatric Clear Liquid (BARICLLIQ)     Special Instructions for Nursing:  Bariatric Clear Liquid,  start 6 hours postop if no nausea vomiting (04-21-23 @ 17:11)

## 2023-04-22 NOTE — DISCHARGE NOTE PROVIDER - CARE PROVIDER_API CALL
Hermelinda Monahan (MD)  Surgery  [address]  [place] [address]  Phone: [phone]  Fax: [phone]  Follow Up Time: 1 week

## 2023-04-23 ENCOUNTER — TRANSCRIPTION ENCOUNTER (OUTPATIENT)
Age: 48
End: 2023-04-23

## 2023-04-26 DIAGNOSIS — Z91.018 ALLERGY TO OTHER FOODS: ICD-10-CM

## 2023-04-26 DIAGNOSIS — E78.00 PURE HYPERCHOLESTEROLEMIA, UNSPECIFIED: ICD-10-CM

## 2023-04-26 DIAGNOSIS — F41.9 ANXIETY DISORDER, UNSPECIFIED: ICD-10-CM

## 2023-04-26 DIAGNOSIS — I10 ESSENTIAL (PRIMARY) HYPERTENSION: ICD-10-CM

## 2023-04-26 DIAGNOSIS — Z86.16 PERSONAL HISTORY OF COVID-19: ICD-10-CM

## 2023-04-26 DIAGNOSIS — E66.01 MORBID (SEVERE) OBESITY DUE TO EXCESS CALORIES: ICD-10-CM

## 2023-04-26 DIAGNOSIS — Z79.899 OTHER LONG TERM (CURRENT) DRUG THERAPY: ICD-10-CM

## 2023-04-26 DIAGNOSIS — D57.3 SICKLE-CELL TRAIT: ICD-10-CM

## 2023-04-26 DIAGNOSIS — E78.1 PURE HYPERGLYCERIDEMIA: ICD-10-CM

## 2023-04-26 DIAGNOSIS — Z88.7 ALLERGY STATUS TO SERUM AND VACCINE: ICD-10-CM

## 2023-04-26 DIAGNOSIS — Z98.890 OTHER SPECIFIED POSTPROCEDURAL STATES: ICD-10-CM

## 2023-04-26 DIAGNOSIS — E55.9 VITAMIN D DEFICIENCY, UNSPECIFIED: ICD-10-CM

## 2023-05-03 ENCOUNTER — APPOINTMENT (OUTPATIENT)
Dept: BARIATRICS | Facility: CLINIC | Age: 48
End: 2023-05-03
Payer: COMMERCIAL

## 2023-05-03 VITALS
WEIGHT: 195 LBS | OXYGEN SATURATION: 100 % | TEMPERATURE: 97.2 F | HEART RATE: 68 BPM | SYSTOLIC BLOOD PRESSURE: 129 MMHG | BODY MASS INDEX: 32.49 KG/M2 | DIASTOLIC BLOOD PRESSURE: 75 MMHG | HEIGHT: 65 IN

## 2023-05-03 PROBLEM — E78.5 HYPERLIPIDEMIA, UNSPECIFIED: Chronic | Status: ACTIVE | Noted: 2023-04-20

## 2023-05-03 PROCEDURE — 99024 POSTOP FOLLOW-UP VISIT: CPT

## 2023-05-03 NOTE — ASSESSMENT
[FreeTextEntry1] : Pt is a 46 y/o F 2 weeks s/p VSG who presents today feeling well here for post op care. Pt denies any fevers, chest pn, sob, calf pain, n,v,c,d, reflux, or abd pn since sx. Pt states she has been walking throughout the day for exercise, notices some fatigue which I reassured pt is normal after sx. States she is consuming 1 protein shake daily and is also consuming greek yogurt and blended protein based soups. Reports drinking plenty of daily fluids and is compliant with her daily vit. Pt complaining of itching around her incisions likely due to dermabond. Dermabond was removed in office today, pt tolerated well. Advised pt to clean incisions daily with soap and water. Pt t consult with nutrition today. No other concerns at this time.

## 2023-05-03 NOTE — PHYSICAL EXAM
[Obese] : obese [Normal] : affect appropriate [de-identified] : incisions healing well, dermabond removed in office today. no evidence of bleeding, oozing, or infection

## 2023-05-03 NOTE — HISTORY OF PRESENT ILLNESS
[de-identified] : Pt is a 48 y/o F 2 weeks s/p VSG who presents today feeling well here for post op care. Pt denies any fevers, chest pn, sob, calf pain, n,v,c,d, reflux, or abd pn since sx. Pt states she has been walking throughout the day for exercise, notices some fatigue which I reassured pt is normal after sx. States she is consuming 1 protein shake daily and is also consuming greek yogurt and blended protein based soups. Reports drinking plenty of daily fluids and is compliant with her daily vit. Pt complaining of itching around her incisions likely due to dermabond. Dermabond was removed in office today, pt tolerated well. Advised pt to clean incisions daily with soap and water. Pt t consult with nutrition today. No other concerns at this time.

## 2023-05-03 NOTE — ADDENDUM
[FreeTextEntry1] : I, Dr. Hermelinda Monahan, spent 25 minutes with the patient >50% counseling/coordination of care including, reviewing the patient's history, performing an examination, reviewing relevant labs and radiographic imaging, reviewing PCP and consultant notes, discussion of medical and surgical management of the diagnosis as well as associated risks and benefits, and completing documentation.\par

## 2023-05-04 LAB — SURGICAL PATHOLOGY STUDY: SIGNIFICANT CHANGE UP

## 2023-05-31 ENCOUNTER — APPOINTMENT (OUTPATIENT)
Dept: BARIATRICS | Facility: CLINIC | Age: 48
End: 2023-05-31
Payer: COMMERCIAL

## 2023-05-31 VITALS
DIASTOLIC BLOOD PRESSURE: 71 MMHG | BODY MASS INDEX: 30.82 KG/M2 | HEIGHT: 65 IN | HEART RATE: 67 BPM | WEIGHT: 185 LBS | TEMPERATURE: 97.7 F | SYSTOLIC BLOOD PRESSURE: 109 MMHG | OXYGEN SATURATION: 97 %

## 2023-05-31 PROCEDURE — 99024 POSTOP FOLLOW-UP VISIT: CPT

## 2023-05-31 NOTE — HISTORY OF PRESENT ILLNESS
[de-identified] : Pt is a 48 y/o F 1 mo s/p VSG who presents today feeling well here for post op care. Pt reports doing great overall and is very pleased with her 23 lb wt loss. States she has started some solid protein foods but has not fully progressed, continues to have 2-3 protein shakes daily. Will consult with nutrition today to aid diet progression. Reports some nausea which is likely related to eating too quickly. Reviewed diet recommendations and pt understands the importance of eating and chewing slowly, and recommended taking 25-30 mins to finish a portion of food. Denies any vomiting, reflux, or abd pn. Reports some constipation, recommended pt to increase dietary fiber intake from fruits and vegetables and to start metamucil daily. States she has been walking daily for exercise and reminded pt that she is able to start strength training in 2 more weeks. States she is compliant with her daily vit. No other concerns at this time. \par

## 2023-05-31 NOTE — ASSESSMENT
[FreeTextEntry1] : Pt is a 46 y/o F 1 mo s/p VSG who presents today feeling well here for post op care. Pt reports doing great overall and is very pleased with her 23 lb wt loss. States she has started some solid protein foods but has not fully progressed, continues to have 2-3 protein shakes daily. Will consult with nutrition today to aid diet progression. Reports some nausea which is likely related to eating too quickly. Reviewed diet recommendations and pt understands the importance of eating and chewing slowly, and recommended taking 25-30 mins to finish a portion of food. Denies any vomiting, reflux, or abd pn. Reports some constipation, recommended pt to increase dietary fiber intake from fruits and vegetables and to start metamucil daily. States she has been walking daily for exercise and reminded pt that she is able to start strength training in 2 more weeks. States she is compliant with her daily vit. No other concerns at this time. \par

## 2023-05-31 NOTE — REVIEW OF SYSTEMS
[Negative] : Allergic/Immunologic
Class I (easy) - visualization of the soft palate, fauces, uvula, and both anterior and posterior pillars

## 2023-07-24 ENCOUNTER — RX RENEWAL (OUTPATIENT)
Age: 48
End: 2023-07-24

## 2023-08-08 ENCOUNTER — APPOINTMENT (OUTPATIENT)
Dept: NEUROLOGY | Facility: CLINIC | Age: 48
End: 2023-08-08
Payer: COMMERCIAL

## 2023-08-08 VITALS
DIASTOLIC BLOOD PRESSURE: 71 MMHG | BODY MASS INDEX: 28.82 KG/M2 | OXYGEN SATURATION: 98 % | WEIGHT: 173 LBS | HEART RATE: 70 BPM | SYSTOLIC BLOOD PRESSURE: 106 MMHG | HEIGHT: 65 IN | TEMPERATURE: 98.2 F

## 2023-08-08 DIAGNOSIS — G93.40 ENCEPHALOPATHY, UNSPECIFIED: ICD-10-CM

## 2023-08-08 DIAGNOSIS — R41.3 OTHER AMNESIA: ICD-10-CM

## 2023-08-08 PROCEDURE — 99213 OFFICE O/P EST LOW 20 MIN: CPT

## 2023-08-08 RX ORDER — FLUTICASONE PROPIONATE 50 UG/1
50 SPRAY, METERED NASAL DAILY
Qty: 1 | Refills: 1 | Status: DISCONTINUED | COMMUNITY
Start: 2023-03-13 | End: 2023-08-08

## 2023-08-08 RX ORDER — ERGOCALCIFEROL 1.25 MG/1
1.25 MG CAPSULE, LIQUID FILLED ORAL
Qty: 8 | Refills: 0 | Status: DISCONTINUED | COMMUNITY
Start: 2023-04-10 | End: 2023-08-08

## 2023-08-08 RX ORDER — OMEPRAZOLE 20 MG/1
20 CAPSULE, DELAYED RELEASE ORAL
Refills: 0 | Status: DISCONTINUED | COMMUNITY
End: 2023-08-08

## 2023-08-08 NOTE — HISTORY OF PRESENT ILLNESS
[FreeTextEntry1] : Patient went of the memantine for her gastric sleeve surgery she went back to being slow on the uptake and slow to process. now back of the memantine BID and feels much better.  Skin normal color for race, warm, dry and intact. No evidence of rash.

## 2023-08-08 NOTE — ASSESSMENT
[FreeTextEntry1] : refill memantine and Effexor.  Patient's persistent cognitive impairment will necessitate a full cognitive evaluation since she is still requiring memantine to function.  - neurocog assessment

## 2023-08-08 NOTE — PHYSICAL EXAM
[FreeTextEntry1] : The patient is alert and oriented x3, naming intact x3, repetition normal, follows three-step commands, and is able to participate fully in the history taking. Speech is normal with no evidence of dysarthria. Memory is intact: Immediate recall 3 out of 3, short-term 3 out of 3, remote memory intact Cranial nerves II through XII intact Motor exam: Upper and lower extremities 5 out of 5 power, normal tone. No abnormal movements noted. Sensory exam: Intact to light touch and pinprick. Romberg negative. Coordination and vestibular exam: Finger to nose intact, no evidence of truncal or appendicular ataxia. No evidence of nystagmus. no vestibular symptoms elicited with head turning during ambulation. Gait: Normal stance and gait. Reflexes: One to 2+ in upper and lower extremities. No pathological reflexes. Downgoing toes.

## 2023-08-23 ENCOUNTER — APPOINTMENT (OUTPATIENT)
Dept: BARIATRICS | Facility: CLINIC | Age: 48
End: 2023-08-23
Payer: COMMERCIAL

## 2023-08-23 VITALS
BODY MASS INDEX: 28.32 KG/M2 | DIASTOLIC BLOOD PRESSURE: 69 MMHG | HEIGHT: 65 IN | SYSTOLIC BLOOD PRESSURE: 107 MMHG | WEIGHT: 170 LBS | OXYGEN SATURATION: 98 % | TEMPERATURE: 97.2 F | HEART RATE: 65 BPM

## 2023-08-23 PROCEDURE — 99212 OFFICE O/P EST SF 10 MIN: CPT

## 2023-08-23 NOTE — HISTORY OF PRESENT ILLNESS
[de-identified] : Pt is a 47 y/o F 3 mo s/p VSG who presents today feeling well here for routine f/u. Pt reports doing well overall and states she is tolerating diet and has been focusing on consuming adequate daily protein and fiber intake. Denies any n,v,gerd, abd pn. Reports moving her bowels 2-3x/week, recommended taking metamucil daily and ensuring adequate daily fluid intake. Pt states she is following neurology after developing encephalopathy after having covid in 2020. Pt has lost 44 lbs since her sx and is very pleased with her progress. No other concerns at this time. Pt requesting to have labs done, will provide orders today.

## 2023-08-23 NOTE — ASSESSMENT
[FreeTextEntry1] : Pt is a 47 y/o F 3 mo s/p VSG who presents today feeling well here for routine f/u. Pt reports doing well overall and states she is tolerating diet and has been focusing on consuming adequate daily protein and fiber intake. Denies any n,v,gerd, abd pn. Reports moving her bowels 2-3x/week, recommended taking metamucil daily and ensuring adequate daily fluid intake. Pt states she is following neurology after developing encephalopathy after having covid in 2020. Pt has lost 44 lbs since her sx and is very pleased with her progress. No other concerns at this time. Pt requesting to have labs done, will provide orders today.

## 2023-09-05 LAB
25(OH)D3 SERPL-MCNC: 46.5 NG/ML
A-TOCOPHEROL VIT E SERPL-MCNC: 20.6 MG/L
ALBUMIN SERPL ELPH-MCNC: 4.7 G/DL
ALP BLD-CCNC: 69 U/L
ALT SERPL-CCNC: 16 U/L
ANION GAP SERPL CALC-SCNC: 13 MMOL/L
APTT BLD: 29.8 SEC
AST SERPL-CCNC: 16 U/L
BETA+GAMMA TOCOPHEROL SERPL-MCNC: 0.7 MG/L
BILIRUB SERPL-MCNC: 0.4 MG/DL
BUN SERPL-MCNC: 19 MG/DL
CA-I SERPL-SCNC: 5.1 MG/DL
CALCIUM SERPL-MCNC: 9.8 MG/DL
CALCIUM SERPL-MCNC: 9.8 MG/DL
CHLORIDE SERPL-SCNC: 101 MMOL/L
CHOLEST SERPL-MCNC: 227 MG/DL
CO2 SERPL-SCNC: 25 MMOL/L
CREAT SERPL-MCNC: 0.66 MG/DL
EGFR: 109 ML/MIN/1.73M2
ESTIMATED AVERAGE GLUCOSE: 97 MG/DL
FOLATE SERPL-MCNC: 19.8 NG/ML
GLUCOSE SERPL-MCNC: 91 MG/DL
HBA1C MFR BLD HPLC: 5 %
HDLC SERPL-MCNC: 46 MG/DL
INR PPP: 1.05 RATIO
IRON SATN MFR SERPL: 31 %
IRON SERPL-MCNC: 106 UG/DL
LDLC SERPL CALC-MCNC: 150 MG/DL
NONHDLC SERPL-MCNC: 181 MG/DL
PARATHYROID HORMONE INTACT: 41 PG/ML
POTASSIUM SERPL-SCNC: 4.9 MMOL/L
PREALB SERPL NEPH-MCNC: 22 MG/DL
PROT SERPL-MCNC: 7.8 G/DL
PT BLD: 11.9 SEC
SODIUM SERPL-SCNC: 139 MMOL/L
TIBC SERPL-MCNC: 338 UG/DL
TRIGL SERPL-MCNC: 170 MG/DL
TSH SERPL-ACNC: 0.97 UIU/ML
UIBC SERPL-MCNC: 232 UG/DL
VIT A SERPL-MCNC: 41.6 UG/DL
VIT B1 SERPL-MCNC: 106.1 NMOL/L
VIT B12 SERPL-MCNC: 933 PG/ML
ZINC SERPL-MCNC: 66 UG/DL

## 2023-09-21 ENCOUNTER — NON-APPOINTMENT (OUTPATIENT)
Age: 48
End: 2023-09-21

## 2023-09-21 ENCOUNTER — APPOINTMENT (OUTPATIENT)
Dept: GYNECOLOGIC ONCOLOGY | Facility: CLINIC | Age: 48
End: 2023-09-21
Payer: COMMERCIAL

## 2023-09-21 VITALS
HEIGHT: 65 IN | SYSTOLIC BLOOD PRESSURE: 120 MMHG | BODY MASS INDEX: 27.66 KG/M2 | OXYGEN SATURATION: 99 % | WEIGHT: 166 LBS | DIASTOLIC BLOOD PRESSURE: 73 MMHG | HEART RATE: 77 BPM | TEMPERATURE: 97.2 F

## 2023-09-21 DIAGNOSIS — Z12.39 ENCOUNTER FOR OTHER SCREENING FOR MALIGNANT NEOPLASM OF BREAST: ICD-10-CM

## 2023-09-21 DIAGNOSIS — N92.6 IRREGULAR MENSTRUATION, UNSPECIFIED: ICD-10-CM

## 2023-09-21 PROCEDURE — 99203 OFFICE O/P NEW LOW 30 MIN: CPT

## 2023-09-25 DIAGNOSIS — Z20.822 CONTACT WITH AND (SUSPECTED) EXPOSURE TO COVID-19: ICD-10-CM

## 2023-09-25 LAB — SARS-COV-2 N GENE NPH QL NAA+PROBE: NOT DETECTED

## 2023-09-26 LAB
RAPID RVP RESULT: DETECTED
RV+EV RNA SPEC QL NAA+PROBE: DETECTED
SARS-COV-2 RNA PNL RESP NAA+PROBE: NOT DETECTED

## 2023-10-02 NOTE — DISCUSSION/SUMMARY
[FreeTextEntry1] : 47 year old female, never smoker with h/o childhood asthma was referred to pulmonary clinic by Dr. Monahan for pulmonary evaluation before bariatric surgery.\par \par Review:\par - Labs\par - Bariatric surgery notes\par \par Plan for CXr, PFT, 6 minute walk test and sleep study (snoring only with obesity) Post-Care Instructions: Patient instructed to not lie down for 4 hours and limit physical activity for 24 hours.

## 2023-10-09 ENCOUNTER — APPOINTMENT (OUTPATIENT)
Dept: MAMMOGRAPHY | Facility: HOSPITAL | Age: 48
End: 2023-10-09
Payer: COMMERCIAL

## 2023-10-09 ENCOUNTER — OUTPATIENT (OUTPATIENT)
Dept: OUTPATIENT SERVICES | Facility: HOSPITAL | Age: 48
LOS: 1 days | End: 2023-10-09
Payer: COMMERCIAL

## 2023-10-09 ENCOUNTER — APPOINTMENT (OUTPATIENT)
Dept: ULTRASOUND IMAGING | Facility: HOSPITAL | Age: 48
End: 2023-10-09
Payer: COMMERCIAL

## 2023-10-09 ENCOUNTER — RESULT REVIEW (OUTPATIENT)
Age: 48
End: 2023-10-09

## 2023-10-09 DIAGNOSIS — Z98.890 OTHER SPECIFIED POSTPROCEDURAL STATES: Chronic | ICD-10-CM

## 2023-10-09 PROCEDURE — 77063 BREAST TOMOSYNTHESIS BI: CPT

## 2023-10-09 PROCEDURE — 76830 TRANSVAGINAL US NON-OB: CPT | Mod: 26

## 2023-10-09 PROCEDURE — 76856 US EXAM PELVIC COMPLETE: CPT | Mod: 26

## 2023-10-09 PROCEDURE — 77067 SCR MAMMO BI INCL CAD: CPT | Mod: 26

## 2023-10-09 PROCEDURE — 76641 ULTRASOUND BREAST COMPLETE: CPT

## 2023-10-09 PROCEDURE — 76641 ULTRASOUND BREAST COMPLETE: CPT | Mod: 26,50

## 2023-10-09 PROCEDURE — 77067 SCR MAMMO BI INCL CAD: CPT

## 2023-10-09 PROCEDURE — 76856 US EXAM PELVIC COMPLETE: CPT

## 2023-10-09 PROCEDURE — 77063 BREAST TOMOSYNTHESIS BI: CPT | Mod: 26

## 2023-10-09 PROCEDURE — 76830 TRANSVAGINAL US NON-OB: CPT

## 2023-10-10 ENCOUNTER — TRANSCRIPTION ENCOUNTER (OUTPATIENT)
Age: 48
End: 2023-10-10

## 2023-11-22 ENCOUNTER — APPOINTMENT (OUTPATIENT)
Dept: BARIATRICS | Facility: CLINIC | Age: 48
End: 2023-11-22
Payer: COMMERCIAL

## 2023-11-22 VITALS
OXYGEN SATURATION: 99 % | TEMPERATURE: 97.4 F | SYSTOLIC BLOOD PRESSURE: 110 MMHG | WEIGHT: 165 LBS | DIASTOLIC BLOOD PRESSURE: 70 MMHG | BODY MASS INDEX: 27.49 KG/M2 | HEIGHT: 65 IN | HEART RATE: 77 BPM

## 2023-11-22 DIAGNOSIS — Z98.84 BARIATRIC SURGERY STATUS: ICD-10-CM

## 2023-11-22 PROCEDURE — 99214 OFFICE O/P EST MOD 30 MIN: CPT

## 2023-12-19 ENCOUNTER — APPOINTMENT (OUTPATIENT)
Dept: HEART AND VASCULAR | Facility: CLINIC | Age: 48
End: 2023-12-19
Payer: COMMERCIAL

## 2023-12-19 VITALS
TEMPERATURE: 98 F | OXYGEN SATURATION: 96 % | BODY MASS INDEX: 26.82 KG/M2 | SYSTOLIC BLOOD PRESSURE: 110 MMHG | DIASTOLIC BLOOD PRESSURE: 74 MMHG | WEIGHT: 161 LBS | HEIGHT: 65 IN | HEART RATE: 83 BPM

## 2023-12-19 DIAGNOSIS — B96.89 CHRONIC SINUSITIS, UNSPECIFIED: ICD-10-CM

## 2023-12-19 DIAGNOSIS — J32.9 CHRONIC SINUSITIS, UNSPECIFIED: ICD-10-CM

## 2023-12-19 DIAGNOSIS — J06.9 ACUTE UPPER RESPIRATORY INFECTION, UNSPECIFIED: ICD-10-CM

## 2023-12-19 PROCEDURE — 99213 OFFICE O/P EST LOW 20 MIN: CPT

## 2023-12-20 PROBLEM — J32.9 SINUSITIS, BACTERIAL: Status: ACTIVE | Noted: 2023-12-20

## 2023-12-20 LAB
CORONAVIRUS (229E,HKU1,NL63,OC43): DETECTED
RAPID RVP RESULT: DETECTED
SARS-COV-2 RNA PNL RESP NAA+PROBE: NOT DETECTED

## 2023-12-20 NOTE — HISTORY OF PRESENT ILLNESS
[FreeTextEntry1] : The patient had strep recently.  Now she has a cough and pressure in her forehead.  She has no sputum.  She has a runny and stuffy nose.  She has fevers.  She denies a sore throat.  She has had nausea.  She is taking Advil.  When she walks she feels a burning in her lungs.  She has been taking an expectorant.

## 2023-12-20 NOTE — DISCUSSION/SUMMARY
[FreeTextEntry1] : The patient has an upper respiratory infection.  She has sinus symptoms and this may be bacterial.  She was started on doxycycline but the swab subsequently showed coronavirus but not COVID.  No antibiotics is necessary.  Patient will take Robitussin DM.  She will adhere to her diet for hypercholesterolemia.

## 2024-01-11 ENCOUNTER — TRANSCRIPTION ENCOUNTER (OUTPATIENT)
Age: 49
End: 2024-01-11

## 2024-02-01 ENCOUNTER — NON-APPOINTMENT (OUTPATIENT)
Age: 49
End: 2024-02-01

## 2024-02-01 ENCOUNTER — APPOINTMENT (OUTPATIENT)
Dept: HEART AND VASCULAR | Facility: CLINIC | Age: 49
End: 2024-02-01
Payer: COMMERCIAL

## 2024-02-01 VITALS
WEIGHT: 163 LBS | BODY MASS INDEX: 27.16 KG/M2 | HEART RATE: 75 BPM | OXYGEN SATURATION: 97 % | SYSTOLIC BLOOD PRESSURE: 114 MMHG | HEIGHT: 65 IN | DIASTOLIC BLOOD PRESSURE: 72 MMHG

## 2024-02-01 DIAGNOSIS — Z00.00 ENCOUNTER FOR GENERAL ADULT MEDICAL EXAMINATION W/OUT ABNORMAL FINDINGS: ICD-10-CM

## 2024-02-01 DIAGNOSIS — G45.9 TRANSIENT CEREBRAL ISCHEMIC ATTACK, UNSPECIFIED: ICD-10-CM

## 2024-02-01 PROCEDURE — 99396 PREV VISIT EST AGE 40-64: CPT | Mod: 25

## 2024-02-01 PROCEDURE — 93000 ELECTROCARDIOGRAM COMPLETE: CPT

## 2024-02-01 RX ORDER — DOXYCYCLINE HYCLATE 100 MG/1
100 CAPSULE ORAL
Qty: 14 | Refills: 0 | Status: DISCONTINUED | COMMUNITY
Start: 2023-12-19 | End: 2024-02-01

## 2024-02-01 NOTE — DISCUSSION/SUMMARY
[FreeTextEntry1] : The patient has had heavy periods.  She is considering hysterectomy.  Better sleep habits were discussed.  She is continuing memantine for her memory.  She is up-to-date on her health maintenance except colonoscopy which I will arrange.  Laboratory testing will be done tomorrow.  EKG shows flipped T wave in V2 which is a minor abnormality.  Her prior stress test and echocardiogram were unremarkable.  The patient will contact me what the names of her other providers. She is not having any cognitive impairment. She has no significant current or past depression. She is fully functional and there are no safety issues. She will be screened for eye examination, Colonoscopy,  Pap smear, Bone density, Mammogram and Immunizations over the next 10 years. The patient was furnished with personalized health advice and does not need referral to health education or preventative counseling services. Patient does not need advanced care planning services.  Laboratory testing was done. [EKG obtained to assist in diagnosis and management of assessed problem(s)] : EKG obtained to assist in diagnosis and management of assessed problem(s)

## 2024-02-01 NOTE — HISTORY OF PRESENT ILLNESS
[FreeTextEntry1] : The patient has had heavy periods.  She is considering hysterectomy.  She had an ultrasound done and a fibroid was found.  The patient had 1 day where she was very sleepy.  She has not been sleeping well and stays in bed from 8 PM to 12 midnight awake.  She has 2 coffees a day and 0-4 alcohol drinks a weekend.  Her memory loss returns when she stops her Memantine.  She has had mild vertigo.  She had an eye exam a year ago, Pap smear 2 months ago and mammogram last year.  Her last COVID-vaccine was years ago.

## 2024-02-01 NOTE — PHYSICAL EXAM
[Well Developed] : well developed [Well Nourished] : well nourished [No Acute Distress] : no acute distress [Normal Conjunctiva] : normal conjunctiva [Normal Venous Pressure] : normal venous pressure [No Carotid Bruit] : no carotid bruit [Normal S1, S2] : normal S1, S2 [No Murmur] : no murmur [No Rub] : no rub [No Gallop] : no gallop [Clear Lung Fields] : clear lung fields [Good Air Entry] : good air entry [No Respiratory Distress] : no respiratory distress  [Soft] : abdomen soft [Non Tender] : non-tender [No Masses/organomegaly] : no masses/organomegaly [Normal Bowel Sounds] : normal bowel sounds [Normal Gait] : normal gait [No Edema] : no edema [No Cyanosis] : no cyanosis [No Clubbing] : no clubbing [No Varicosities] : no varicosities [No Rash] : no rash [No Skin Lesions] : no skin lesions [Moves all extremities] : moves all extremities [No Focal Deficits] : no focal deficits [Normal Speech] : normal speech [Alert and Oriented] : alert and oriented [Normal memory] : normal memory [de-identified] : .  Rectal exam refused  [de-identified] : Breast exam refused

## 2024-02-05 LAB
24R-OH-CALCIDIOL SERPL-MCNC: 76.6 PG/ML
ALBUMIN SERPL ELPH-MCNC: 4.3 G/DL
ALP BLD-CCNC: 61 U/L
ALT SERPL-CCNC: 11 U/L
ANION GAP SERPL CALC-SCNC: 11 MMOL/L
AST SERPL-CCNC: 14 U/L
BILIRUB DIRECT SERPL-MCNC: 0.1 MG/DL
BILIRUB INDIRECT SERPL-MCNC: 0.4 MG/DL
BILIRUB SERPL-MCNC: 0.5 MG/DL
BUN SERPL-MCNC: 15 MG/DL
CALCIUM SERPL-MCNC: 9.9 MG/DL
CHLORIDE SERPL-SCNC: 103 MMOL/L
CHOLEST SERPL-MCNC: 253 MG/DL
CO2 SERPL-SCNC: 28 MMOL/L
CREAT SERPL-MCNC: 0.74 MG/DL
EGFR: 100 ML/MIN/1.73M2
ESTIMATED AVERAGE GLUCOSE: 91 MG/DL
FOLATE SERPL-MCNC: 13.7 NG/ML
GLUCOSE SERPL-MCNC: 83 MG/DL
HBA1C MFR BLD HPLC: 4.8 %
HCT VFR BLD CALC: 39.3 %
HDLC SERPL-MCNC: 55 MG/DL
HGB BLD-MCNC: 13.1 G/DL
IRON SATN MFR SERPL: 42 %
IRON SERPL-MCNC: 142 UG/DL
LDLC SERPL CALC-MCNC: 166 MG/DL
MCHC RBC-ENTMCNC: 30.8 PG
MCHC RBC-ENTMCNC: 33.3 GM/DL
MCV RBC AUTO: 92.3 FL
NONHDLC SERPL-MCNC: 198 MG/DL
PLATELET # BLD AUTO: 193 K/UL
POTASSIUM SERPL-SCNC: 4.7 MMOL/L
PROT SERPL-MCNC: 7.2 G/DL
RBC # BLD: 4.26 M/UL
RBC # FLD: 12.8 %
SODIUM SERPL-SCNC: 141 MMOL/L
T4 FREE SERPL-MCNC: 1.4 NG/DL
TIBC SERPL-MCNC: 340 UG/DL
TRIGL SERPL-MCNC: 176 MG/DL
TSH SERPL-ACNC: 0.63 UIU/ML
UIBC SERPL-MCNC: 198 UG/DL
VIT B12 SERPL-MCNC: 1194 PG/ML
WBC # FLD AUTO: 5 K/UL

## 2024-02-07 ENCOUNTER — NON-APPOINTMENT (OUTPATIENT)
Age: 49
End: 2024-02-07

## 2024-02-07 ENCOUNTER — APPOINTMENT (OUTPATIENT)
Dept: GYNECOLOGIC ONCOLOGY | Facility: CLINIC | Age: 49
End: 2024-02-07
Payer: COMMERCIAL

## 2024-02-07 VITALS
DIASTOLIC BLOOD PRESSURE: 78 MMHG | WEIGHT: 163 LBS | HEART RATE: 75 BPM | TEMPERATURE: 98.1 F | BODY MASS INDEX: 27.16 KG/M2 | OXYGEN SATURATION: 97 % | SYSTOLIC BLOOD PRESSURE: 115 MMHG | HEIGHT: 65 IN

## 2024-02-07 PROCEDURE — 58100 BIOPSY OF UTERUS LINING: CPT

## 2024-02-07 NOTE — HISTORY OF PRESENT ILLNESS
[FreeTextEntry1] : Problem 1) Fibroid uterus 2) Heavy menses  Previous Therapy 1)  Pelvic US 10/9/23   a) Uterus 9cm with 4.3cm intramural fibroid posterior fundus  49yo self-referred for consultation for possible hysterectomy for heavy periods since sleeve surgery  (lost 50lbs). Menses have become heavier and lasting for longer, associated lower abdominal bloating and "heaviness" in the lower abdomen. Also has leakage of urine on coughing. Previously tried IUD (years ago), and is not interested in medical management.   History: ObHx:  x2 (22yo at college, 9yo) GynHx: -H/o HPV. Last Pap , normal per patient. PMH: Post-COVID encephalitis (complete recovered now) PSH: Abdominoplasty , gastric sleeve 2023 Meds: Venlafaxine 75mg, memantine (post-COVID encephalitis) Allergies: Pineapple, kiwi, cherry. SH: Ex-cigarette smoker, social alcohol. 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  FMH: Non-contributory  Surveillance: Last mammogram: Last mammogram 10/23/23 Last colonoscopy: Due to schedule.

## 2024-02-07 NOTE — PROCEDURE
[Endometrial Biopsy] : an endometrial biopsy [Other: ___] : [unfilled] [Patient] : the patient [None] : none [Betadine] : betadine [Yes] : the specimen was sent to pathology [No Complications] : none [Tolerated Well] : the patient tolerated the procedure well [FreeTextEntry1] : UPT negative

## 2024-02-07 NOTE — PHYSICAL EXAM
[Chaperone Present] : A chaperone was present in the examining room during all aspects of the physical examination [Normal] : Recto-Vaginal Exam: Normal [Fully active, able to carry on all pre-disease performance without restriction] : Status 0 - Fully active, able to carry on all pre-disease performance without restriction [de-identified] : EMB collected today

## 2024-02-07 NOTE — DISCUSSION/SUMMARY
[Reviewed Clinical Lab Test(s)] : Results of clinical tests were reviewed. [Reviewed Radiology Report(s)] : Radiology reports were reviewed. [Visit Time ___ Minutes] : [unfilled] minutes [Face to Face Time___ Minutes] : with [unfilled] minutes in face to face consultation. [FreeTextEntry1] : With the age of diagrams, I reviewed the findings. Disease processes and pathogenesis have been discussed. We discussed options for management, hysterectomy approaches including vaginal, laparoscopic, and open. Risks benefits and alternatives of all have been discussed with the patient. She declines all forms of medical management of fibroid. At this point is my recommendation that the patient proceed with advanced laparoscopic robotic-assisted total hysterectomy with bilateral salpingectomy. The robotic procedure, inherent risks and benefits, surgical incisions have been reviewed in detail. Complications that include, but are not limited to: bleeding, infection, injury to other organs including bowel, bladder, ureters, blood vessels, nerves, infections, blood clots, lymphedema, pneumonia, wound complications and prolonged hospital stay have all been discussed with the patient. I have also provided her with the diagrams.  All questions were answered to the patients apparent satisfaction. Patient has agreed to the above procedure, and we'll schedule at her convenience.  [] Endometrial biopsy today [] Medical clearance with cardiologist with pre-op labs at that appointment (patient is missing PT/PTT) [] [facility]

## 2024-02-12 ENCOUNTER — TRANSCRIPTION ENCOUNTER (OUTPATIENT)
Age: 49
End: 2024-02-12

## 2024-02-12 LAB — CORE LAB BIOPSY: NORMAL

## 2024-02-20 RX ORDER — VENLAFAXINE 75 MG/1
75 TABLET ORAL DAILY
Qty: 90 | Refills: 1 | Status: ACTIVE | COMMUNITY
Start: 2020-09-17 | End: 1900-01-01

## 2024-02-26 ENCOUNTER — APPOINTMENT (OUTPATIENT)
Dept: GASTROENTEROLOGY | Facility: CLINIC | Age: 49
End: 2024-02-26
Payer: COMMERCIAL

## 2024-02-26 PROCEDURE — 99203 OFFICE O/P NEW LOW 30 MIN: CPT | Mod: 95

## 2024-02-26 NOTE — HISTORY OF PRESENT ILLNESS
[FreeTextEntry1] : 48F with PMHx VSG referred by Dr. Ricketts for colon cancer screening.   HPI- Pt reports that anything that has wheat/gluten does cause bloating and discomfort dairy bothers her as well when avoids these things feels ok  has history of gastric sleeve  denies blood in stool, nausea, vomiting, diarrhea or constipation.   Colon cancer screening- never   PMHx- long covid  PSHx- fibroids  Rx- memantine, venlafaxine Supplements/herbs/OTC- b12, multivitamin, calcium  A/c or NSAIDs? only as needed  FHx- none Allergies- pineapple, kiwi, cherry  ETOH- social  Smoking- none  Drugs- none   Labs/stool tests- February 2024 CBC/BMP wnl  Breath tests- negative h. pylori breath test  Imaging- none EGD- February 2023 grade A esophagitis, 2 cm hiatal hernia Colonoscopy- never

## 2024-02-26 NOTE — ASSESSMENT
[FreeTextEntry1] : 48F with PMHx VSG referred by Dr. Ricketts for colon cancer screening.    Colon cancer screening  - schedule patient for colonoscopy @ [facility], r/a/i/b discussed and patient agreeable  - bowel prep instructions to be provided, MiraLAX  - informed patient escort must pick patient up from procedure  - CBC/CMP in chart  f/u post colonoscopy

## 2024-02-26 NOTE — REASON FOR VISIT
[Home] : at home, [unfilled] , at the time of the visit. [Medical Office: ([facility])___] : at the medical office located in  [Patient] : the patient [Initial Evaluation] : an initial evaluation [FreeTextEntry2] : Rainer Harrison  [FreeTextEntry1] : colon cancer screenin

## 2024-02-28 ENCOUNTER — APPOINTMENT (OUTPATIENT)
Dept: BARIATRICS | Facility: CLINIC | Age: 49
End: 2024-02-28

## 2024-03-05 ENCOUNTER — NON-APPOINTMENT (OUTPATIENT)
Age: 49
End: 2024-03-05

## 2024-03-14 LAB
APTT BLD: 28.8 SEC
INR PPP: 0.92 RATIO
PT BLD: 10.5 SEC

## 2024-03-19 ENCOUNTER — APPOINTMENT (OUTPATIENT)
Dept: HEART AND VASCULAR | Facility: CLINIC | Age: 49
End: 2024-03-19
Payer: COMMERCIAL

## 2024-03-19 VITALS
DIASTOLIC BLOOD PRESSURE: 79 MMHG | BODY MASS INDEX: 27.49 KG/M2 | TEMPERATURE: 97.1 F | HEART RATE: 64 BPM | WEIGHT: 165 LBS | SYSTOLIC BLOOD PRESSURE: 120 MMHG | OXYGEN SATURATION: 97 % | HEIGHT: 65 IN

## 2024-03-19 DIAGNOSIS — E78.00 PURE HYPERCHOLESTEROLEMIA, UNSPECIFIED: ICD-10-CM

## 2024-03-19 DIAGNOSIS — Z01.810 ENCOUNTER FOR PREPROCEDURAL CARDIOVASCULAR EXAMINATION: ICD-10-CM

## 2024-03-19 PROCEDURE — 93000 ELECTROCARDIOGRAM COMPLETE: CPT | Mod: NC

## 2024-03-19 PROCEDURE — 99214 OFFICE O/P EST MOD 30 MIN: CPT | Mod: 25

## 2024-03-19 RX ORDER — MAGNESIUM OXIDE/MAG AA CHELATE 300 MG
CAPSULE ORAL
Refills: 0 | Status: ACTIVE | COMMUNITY

## 2024-03-19 NOTE — PHYSICAL EXAM
[Well Developed] : well developed [Well Nourished] : well nourished [No Acute Distress] : no acute distress [Normal Conjunctiva] : normal conjunctiva [Normal Venous Pressure] : normal venous pressure [No Carotid Bruit] : no carotid bruit [Normal S1, S2] : normal S1, S2 [No Rub] : no rub [No Murmur] : no murmur [No Gallop] : no gallop [Clear Lung Fields] : clear lung fields [Good Air Entry] : good air entry [No Respiratory Distress] : no respiratory distress  [Soft] : abdomen soft [No Masses/organomegaly] : no masses/organomegaly [Non Tender] : non-tender [Normal Bowel Sounds] : normal bowel sounds [Normal Gait] : normal gait [No Cyanosis] : no cyanosis [No Edema] : no edema [No Varicosities] : no varicosities [No Clubbing] : no clubbing [No Skin Lesions] : no skin lesions [No Rash] : no rash [Moves all extremities] : moves all extremities [No Focal Deficits] : no focal deficits [Normal Speech] : normal speech [Alert and Oriented] : alert and oriented [Normal memory] : normal memory

## 2024-03-19 NOTE — HISTORY OF PRESENT ILLNESS
[FreeTextEntry1] : The patient has no chest pain or dyspnea.  She is scheduled for colonoscopy Friday.  She will have a hysterectomy with removal of her tubes.  This is on 26 March.  She continues to have uterine bleeding.

## 2024-03-19 NOTE — DISCUSSION/SUMMARY
[FreeTextEntry1] : Patient is scheduled for colonoscopy and hysterectomy with salpingectomy.  Patient has no chest pain or shortness of breath.  She has no history of cardiac disease.  EKG shows normal sinus rhythm and RSR prime in V1.  She has only had vaginal bleeding.  She has not had difficulty with anesthesia.  This is a low risk procedure.  Her RCRI score is 0.  She is low risk.  She is optimized for surgery and colonoscopy. [EKG obtained to assist in diagnosis and management of assessed problem(s)] : EKG obtained to assist in diagnosis and management of assessed problem(s)

## 2024-03-22 ENCOUNTER — APPOINTMENT (OUTPATIENT)
Age: 49
End: 2024-03-22
Payer: COMMERCIAL

## 2024-03-22 PROCEDURE — 45378 DIAGNOSTIC COLONOSCOPY: CPT

## 2024-03-24 ENCOUNTER — NON-APPOINTMENT (OUTPATIENT)
Age: 49
End: 2024-03-24

## 2024-03-25 ENCOUNTER — TRANSCRIPTION ENCOUNTER (OUTPATIENT)
Age: 49
End: 2024-03-25

## 2024-03-25 VITALS
HEIGHT: 65 IN | TEMPERATURE: 97 F | SYSTOLIC BLOOD PRESSURE: 121 MMHG | RESPIRATION RATE: 16 BRPM | DIASTOLIC BLOOD PRESSURE: 62 MMHG | OXYGEN SATURATION: 100 % | WEIGHT: 165.35 LBS | HEART RATE: 55 BPM

## 2024-03-25 RX ORDER — IBUPROFEN 800 MG/1
800 TABLET, FILM COATED ORAL 3 TIMES DAILY
Qty: 30 | Refills: 0 | Status: ACTIVE | COMMUNITY
Start: 2024-03-25 | End: 1900-01-01

## 2024-03-25 RX ORDER — OXYCODONE AND ACETAMINOPHEN 5; 325 MG/1; MG/1
5-325 TABLET ORAL
Qty: 15 | Refills: 0 | Status: ACTIVE | COMMUNITY
Start: 2024-03-25 | End: 1900-01-01

## 2024-03-25 NOTE — ASU PREOP CHECKLIST - SELECT TESTS ORDERED
BMP/CBC/CMP/PT/PTT/INR/EKG BMP/CBC/CMP/PT/PTT/INR/Type and Screen/EKG FS 90/// Preg test Negative-DOS/BMP/CBC/CMP/PT/PTT/INR/Type and Screen/EKG/POCT Blood Glucose

## 2024-03-25 NOTE — ASU PATIENT PROFILE, ADULT - NSICDXPASTMEDICALHX_GEN_ALL_CORE_FT
PAST MEDICAL HISTORY:  2019 novel coronavirus disease (COVID-19) short term memory loss due to COVID// 2020    Encephalitis due to covid    HLD (hyperlipidemia)     Sickle cell trait

## 2024-03-25 NOTE — ASU PATIENT PROFILE, ADULT - FALL HARM RISK - PT AGE POPULATION HIDDEN
Prevention Guidelines, Women Ages 36 to 52  Screening tests and vaccines are an important part of managing your health. Health counseling is essential, too. Below are guidelines for these, for women ages 36 to 52.  Talk with your healthcare provider to ma Syphilis Women at increased risk for infection – talk with your healthcare provider At routine exams   Tuberculosis Women at increased risk for infection – talk with your healthcare provider Ask your healthcare provider   Vision All women in this age group Breast cancer and chemoprevention Women at high risk for breast cancer When your risk is known   Diet and exercise Women who are overweight or obese When diagnosed, and then at routine exams   Domestic violence Women at the age in which they are able to ha Adult

## 2024-03-26 ENCOUNTER — TRANSCRIPTION ENCOUNTER (OUTPATIENT)
Age: 49
End: 2024-03-26

## 2024-03-26 ENCOUNTER — OUTPATIENT (OUTPATIENT)
Dept: INPATIENT UNIT | Facility: HOSPITAL | Age: 49
LOS: 1 days | Discharge: HOME | End: 2024-03-26
Payer: COMMERCIAL

## 2024-03-26 ENCOUNTER — RESULT REVIEW (OUTPATIENT)
Age: 49
End: 2024-03-26

## 2024-03-26 ENCOUNTER — APPOINTMENT (OUTPATIENT)
Dept: GYNECOLOGIC ONCOLOGY | Facility: HOSPITAL | Age: 49
End: 2024-03-26

## 2024-03-26 VITALS — HEART RATE: 68 BPM | DIASTOLIC BLOOD PRESSURE: 63 MMHG | OXYGEN SATURATION: 100 % | SYSTOLIC BLOOD PRESSURE: 130 MMHG

## 2024-03-26 DIAGNOSIS — Z98.84 BARIATRIC SURGERY STATUS: Chronic | ICD-10-CM

## 2024-03-26 DIAGNOSIS — Z98.890 OTHER SPECIFIED POSTPROCEDURAL STATES: Chronic | ICD-10-CM

## 2024-03-26 LAB
BLD GP AB SCN SERPL QL: NEGATIVE — SIGNIFICANT CHANGE UP
GLUCOSE BLDC GLUCOMTR-MCNC: 90 MG/DL — SIGNIFICANT CHANGE UP (ref 70–99)
RH IG SCN BLD-IMP: POSITIVE — SIGNIFICANT CHANGE UP

## 2024-03-26 PROCEDURE — 86900 BLOOD TYPING SEROLOGIC ABO: CPT

## 2024-03-26 PROCEDURE — 88307 TISSUE EXAM BY PATHOLOGIST: CPT

## 2024-03-26 PROCEDURE — 88307 TISSUE EXAM BY PATHOLOGIST: CPT | Mod: 26

## 2024-03-26 PROCEDURE — 86901 BLOOD TYPING SEROLOGIC RH(D): CPT

## 2024-03-26 PROCEDURE — 58571 TLH W/T/O 250 G OR LESS: CPT

## 2024-03-26 PROCEDURE — 86850 RBC ANTIBODY SCREEN: CPT

## 2024-03-26 PROCEDURE — S2900: CPT

## 2024-03-26 PROCEDURE — 82962 GLUCOSE BLOOD TEST: CPT

## 2024-03-26 RX ORDER — ONDANSETRON 8 MG/1
8 TABLET, FILM COATED ORAL EVERY 8 HOURS
Refills: 0 | Status: DISCONTINUED | OUTPATIENT
Start: 2024-03-26 | End: 2024-03-26

## 2024-03-26 RX ORDER — ACETAMINOPHEN 500 MG
1000 TABLET ORAL ONCE
Refills: 0 | Status: COMPLETED | OUTPATIENT
Start: 2024-03-26 | End: 2024-03-26

## 2024-03-26 RX ORDER — OXYCODONE HYDROCHLORIDE 5 MG/1
10 TABLET ORAL EVERY 4 HOURS
Refills: 0 | Status: DISCONTINUED | OUTPATIENT
Start: 2024-03-26 | End: 2024-03-26

## 2024-03-26 RX ORDER — MEMANTINE HYDROCHLORIDE 10 MG/1
1 TABLET ORAL
Refills: 0 | DISCHARGE

## 2024-03-26 RX ORDER — OXYCODONE HYDROCHLORIDE 5 MG/1
5 TABLET ORAL EVERY 4 HOURS
Refills: 0 | Status: DISCONTINUED | OUTPATIENT
Start: 2024-03-26 | End: 2024-03-26

## 2024-03-26 RX ORDER — HYDROMORPHONE HYDROCHLORIDE 2 MG/ML
0.2 INJECTION INTRAMUSCULAR; INTRAVENOUS; SUBCUTANEOUS
Refills: 0 | Status: DISCONTINUED | OUTPATIENT
Start: 2024-03-26 | End: 2024-03-26

## 2024-03-26 RX ORDER — CELECOXIB 200 MG/1
400 CAPSULE ORAL ONCE
Refills: 0 | Status: COMPLETED | OUTPATIENT
Start: 2024-03-26 | End: 2024-03-26

## 2024-03-26 RX ORDER — HYDROMORPHONE HYDROCHLORIDE 2 MG/ML
0.2 INJECTION INTRAMUSCULAR; INTRAVENOUS; SUBCUTANEOUS
Refills: 0 | Status: COMPLETED | OUTPATIENT
Start: 2024-03-26 | End: 2024-03-26

## 2024-03-26 RX ORDER — METOCLOPRAMIDE HCL 10 MG
10 TABLET ORAL EVERY 8 HOURS
Refills: 0 | Status: DISCONTINUED | OUTPATIENT
Start: 2024-03-26 | End: 2024-03-26

## 2024-03-26 RX ORDER — VENLAFAXINE HCL 75 MG
1 CAPSULE, EXT RELEASE 24 HR ORAL
Refills: 0 | DISCHARGE

## 2024-03-26 RX ORDER — SODIUM CHLORIDE 9 MG/ML
1000 INJECTION, SOLUTION INTRAVENOUS
Refills: 0 | Status: DISCONTINUED | OUTPATIENT
Start: 2024-03-26 | End: 2024-03-26

## 2024-03-26 RX ORDER — ACETAMINOPHEN 500 MG
1000 TABLET ORAL EVERY 6 HOURS
Refills: 0 | Status: DISCONTINUED | OUTPATIENT
Start: 2024-03-26 | End: 2024-03-26

## 2024-03-26 RX ORDER — KETOROLAC TROMETHAMINE 30 MG/ML
15 SYRINGE (ML) INJECTION EVERY 8 HOURS
Refills: 0 | Status: DISCONTINUED | OUTPATIENT
Start: 2024-03-26 | End: 2024-03-26

## 2024-03-26 RX ORDER — SIMETHICONE 80 MG/1
80 TABLET, CHEWABLE ORAL EVERY 6 HOURS
Refills: 0 | Status: DISCONTINUED | OUTPATIENT
Start: 2024-03-26 | End: 2024-03-26

## 2024-03-26 RX ADMIN — SODIUM CHLORIDE 125 MILLILITER(S): 9 INJECTION, SOLUTION INTRAVENOUS at 16:07

## 2024-03-26 RX ADMIN — HYDROMORPHONE HYDROCHLORIDE 0.2 MILLIGRAM(S): 2 INJECTION INTRAMUSCULAR; INTRAVENOUS; SUBCUTANEOUS at 16:40

## 2024-03-26 RX ADMIN — CELECOXIB 400 MILLIGRAM(S): 200 CAPSULE ORAL at 10:07

## 2024-03-26 RX ADMIN — Medication 1000 MILLIGRAM(S): at 10:06

## 2024-03-26 RX ADMIN — HYDROMORPHONE HYDROCHLORIDE 0.2 MILLIGRAM(S): 2 INJECTION INTRAMUSCULAR; INTRAVENOUS; SUBCUTANEOUS at 16:24

## 2024-03-26 RX ADMIN — HYDROMORPHONE HYDROCHLORIDE 0.2 MILLIGRAM(S): 2 INJECTION INTRAMUSCULAR; INTRAVENOUS; SUBCUTANEOUS at 16:20

## 2024-03-26 RX ADMIN — HYDROMORPHONE HYDROCHLORIDE 0.2 MILLIGRAM(S): 2 INJECTION INTRAMUSCULAR; INTRAVENOUS; SUBCUTANEOUS at 16:07

## 2024-03-26 NOTE — ASU DISCHARGE PLAN (ADULT/PEDIATRIC) - CARE PROVIDER_API CALL
Jenni Jerez  Gynecologic Oncology  [address]  [place] [address]  Phone: [phone]  Fax: [phone]  Follow Up Time: 1 month

## 2024-03-26 NOTE — ASU DISCHARGE PLAN (ADULT/PEDIATRIC) - B. DRINK ALCOHOL, BEER, OR WINE
HPI:  80y/o M with PMHx sig for HTN, HLD, afib (on eliquis, unknown if last taken today,) CVA in 2020 (with minimal residual right-sided weakness per girlfriend,) last known normal at 3pm when girlfriend spoke to him. EMS found patient in his office at [employer] (he is a neuroscience professor,) with dysarthria, left facial droop, and left-sided weakness. Upon arrival to [facility], BP noted to be 171/101. Stroke code was called. CTH without findings of hemorrhage. 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 NIHSS 20. No TPA was administered as Pt is out of window. Per patient's girlfriend, the basilar artery aneurysm is known and was diagnosed in 2020 at [facility] when he suffered from a CVA. At that time, he underwent a "treatment for the aneurysm," which the girlfriend reports was too risky and was aborted. Patient never attended his follow-up appointments due to fear per girlfriend.  (10 Mar 2022 22:52)    Hospital Course:  3/10: Admitted for further workup of stroke symptoms and basilar artery aneurysm.  3/11: Vader placed overnight. 3% hypertonic saline started. Neuro exam stable. Started on vEEG for aphasia  3/12: GM overnight. Neuro exam stable. Remains on 3% saline.    Vital Signs Last 24 Hrs  T(C): 37.6 (11 Mar 2022 22:00), Max: 38.5 (11 Mar 2022 07:00)  T(F): 99.7 (11 Mar 2022 22:00), Max: 101.3 (11 Mar 2022 07:00)  HR: 71 (11 Mar 2022 23:00) (60 - 91)  BP: 118/56 (11 Mar 2022 17:08) (118/56 - 153/65)  BP(mean): 81 (11 Mar 2022 17:08) (81 - 94)  RR: 32 (11 Mar 2022 23:00) (16 - 36)  SpO2: 94% (11 Mar 2022 23:00) (94% - 98%)    I&O's Summary    10 Mar 2022 07:01  -  11 Mar 2022 07:00  --------------------------------------------------------  IN: 862 mL / OUT: 575 mL / NET: 287 mL    11 Mar 2022 07:01  -  12 Mar 2022 00:12  --------------------------------------------------------  IN: 1899.8 mL / OUT: 890 mL / NET: 1009.8 mL        PHYSICAL EXAM:  General: NAD, pt is comfortably sitting up in bed, on room air  HEENT: CN II-XII grossly intact, PERRL 3mm briskly reactive, EOMI b/l, face symmetric, tongue midline, neck FROM  Cardiovascular: RRR, normal S1 and S2   Respiratory: lungs CTAB, no wheezing, rhonchi, or crackles   GI: normoactive BS to auscultation, abd soft, NTND   Neuro: A&Ox3 with choices, expressive aphasia, dysarthric but attempting to mouth words. Follows commands.  Motor: RUE 5/5 throughout, RLE HF 3/5, KE/KF 4-/5, DF/PF 5/5, LUE proximally 2/5 o/w 0/5 and withdrawing briskly, LLE TF. SILT throughout   Extremities: PT/DP pulses 2+ and symmetric  Wound/incision: R groin dressing C/D/I, no hematoma    TUBES/LINES:  [x] Amrte  [] Lumbar Drain  [] Wound Drains  [x] Others: left radial allison    DIET:  [x] NPO  [] Mechanical  [] Tube feeds    LABS:                        14.2   8.97  )-----------( 169      ( 11 Mar 2022 17:05 )             41.3     03-11    145  |  113<H>  |  29<H>  ----------------------------<  106<H>  3.9   |  23  |  1.05    Ca    8.1<L>      11 Mar 2022 22:02  Phos  2.6     03-11  Mg     2.0     03-11    TPro  6.8  /  Alb  4.3  /  TBili  0.7  /  DBili  x   /  AST  16  /  ALT  17  /  AlkPhos  61  03-10    PT/INR - ( 10 Mar 2022 21:23 )   PT: 13.9 sec;   INR: 1.17          PTT - ( 10 Mar 2022 21:23 )  PTT:31.4 sec    CARDIAC MARKERS ( 10 Mar 2022 21:23 )  x     / 0.01 ng/mL / x     / x     / x          CAPILLARY BLOOD GLUCOSE      POCT Blood Glucose.: 95 mg/dL (11 Mar 2022 23:52)  POCT Blood Glucose.: 116 mg/dL (11 Mar 2022 17:16)  POCT Blood Glucose.: 135 mg/dL (11 Mar 2022 11:21)  POCT Blood Glucose.: 118 mg/dL (11 Mar 2022 05:40)  POCT Blood Glucose.: 187 mg/dL (11 Mar 2022 01:24)      Drug Levels: [] N/A    CSF Analysis: [] N/A      Allergies    No Known Allergies    Intolerances      MEDICATIONS:  Antibiotics:    Neuro:    Anticoagulation:    OTHER:  bisacodyl Suppository 10 milliGRAM(s) Rectal daily  dextrose 40% Gel 15 Gram(s) Oral once  dextrose 50% Injectable 25 Gram(s) IV Push once  dextrose 50% Injectable 12.5 Gram(s) IV Push once  dextrose 50% Injectable 25 Gram(s) IV Push once  glucagon  Injectable 1 milliGRAM(s) IntraMuscular once  hydrALAZINE Injectable 10 milliGRAM(s) IV Push every 4 hours PRN  insulin lispro (ADMELOG) corrective regimen sliding scale   SubCutaneous every 6 hours  polyethylene glycol 3350 17 Gram(s) Oral daily    IVF:  dextrose 5%. 1000 milliLiter(s) IV Continuous <Continuous>  dextrose 5%. 1000 milliLiter(s) IV Continuous <Continuous>  potassium chloride  20 mEq/100 mL IVPB 20 milliEquivalent(s) IV Intermittent every 2 hours  sodium chloride 0.9%. 1000 milliLiter(s) IV Continuous <Continuous>  sodium chloride 3%. 500 milliLiter(s) IV Continuous <Continuous>    CULTURES:    RADIOLOGY & ADDITIONAL TESTS:      ASSESSMENT:  80y/o M with PMHx sig for HTN, HLD, afib (on eliquis, unknown if last taken today,) CVA in 2020 (with minimal residual right-sided weakness per girlfriend) p/w dysarthria, left facial droop, and left-sided weakness. CTH without findings of hemorrhage. 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 NIHSS 20. Pt is not a tpa candidate as Pt is out of window. Patient now s/p diagnostic angio with findings of partially thrombosed basilar artery aneurysm 3/11/22    BASILAR ARTERY ANEURYSM    Handoff    MEWS Score    Brain aneurysm    Brain aneurysm    Basilar artery aneurysm    Angiogram, carotid and cerebral, bilateral    STROKE    [email or web address]      PLAN:  Neuro:  -neuro/vital checks q1h  -MR head complete 3/11 no acute infarcts  -stroke core measures  -EEG placed 3/11, f/u read  -Remove groin dressing in AM, flat x 6 H    Cardiac:  -SBP goal 100-160, off cardene gtt  -home norvasc, captopril, carvedilol, and HCTZ held  -echo 3/11- mild LVH, EF 65-70%  -h/o afib (f/u EKG), eliquis held    Pulm:  -room air    Renal:  - IVF while NPO  - marte in place, d/c in AM  - Na goal 140-145  - 3% at 25, NS at 75    GI:  -NPO for now- ADAT  -bowel regimen    Endo:  -ISS   -A1C 5.4    Heme;  -H/H stable  -SCDs  -home eliquis held  -LE dopplers 3/11 negative    ID:  -no issues, afebrile    Dispo:  ICU status, full code, dispo pending  Family updated with plan    D/w Dr. Peterson and Dr. Gay   Statement Selected

## 2024-03-26 NOTE — PROGRESS NOTE ADULT - SUBJECTIVE AND OBJECTIVE BOX
GYN POC   Patient seen at bedside.  Pain controlled. Ice chips at bedside but has not yet tried them.    Denies CP, SOB.     Got OOB to chair.  Not yet voided.    Vital Signs Last 24 Hrs  T(C): 36.2 (26 Mar 2024 15:35), Max: 36.2 (26 Mar 2024 15:35)  T(F): 97.2 (26 Mar 2024 15:35), Max: 97.2 (26 Mar 2024 15:35)  HR: 64 (26 Mar 2024 18:09) (58 - 77)  BP: 116/58 (26 Mar 2024 18:09) (115/61 - 137/67)  BP(mean): 81 (26 Mar 2024 18:09) (81 - 96)  RR: 14 (26 Mar 2024 17:48) (12 - 21)  SpO2: 92% (26 Mar 2024 18:09) (92% - 100%)    Parameters below as of 26 Mar 2024 18:09  Patient On (Oxygen Delivery Method): room air        Physical Exam:  Gen: No Acute Distress  Pulm: Breathing comfortably  GI: soft, appropriately tender, no rebound or guarding. Incisions clean/dry/intact.  Ext: SCDs in place, [facility]    I&O's Summary    26 Mar 2024 07:01  -  26 Mar 2024 18:56  --------------------------------------------------------  IN: 375 mL / OUT: 0 mL / NET: 375 mL      MEDICATIONS  (STANDING):  acetaminophen     Tablet .. 1000 milliGRAM(s) Oral every 6 hours  HYDROmorphone  Injectable 0.2 milliGRAM(s) IV Push every 15 minutes  ketorolac   Injectable 15 milliGRAM(s) IV Push every 8 hours  lactated ringers. 1000 milliLiter(s) (125 mL/Hr) IV Continuous <Continuous>    MEDICATIONS  (PRN):  metoclopramide Injectable 10 milliGRAM(s) IV Push every 8 hours PRN Nausea and/or Vomiting (2nd line)  ondansetron Injectable 8 milliGRAM(s) IV Push every 8 hours PRN Nausea and/or Vomiting (1st line)  oxyCODONE    IR 5 milliGRAM(s) Oral every 4 hours PRN Moderate Pain (4 - 6)  oxyCODONE    IR 10 milliGRAM(s) Oral every 4 hours PRN Severe Pain (7 - 10)  simethicone 80 milliGRAM(s) Chew every 6 hours PRN Gas    Allergies    Cherries (Anaphylaxis)  Kiwi (Anaphylaxis)  influenza virus vaccine, inactivated (Unknown)  Pineapple (Rash)    Intolerances        LABS:

## 2024-03-26 NOTE — PROGRESS NOTE ADULT - ASSESSMENT
47 yo  s/p RA TLH BS for fibroids, ebl 25cc  Doing well in PACU  Await tolerance of PO, ambulation, and TOV (1137)

## 2024-03-26 NOTE — BRIEF OPERATIVE NOTE - NSICDXBRIEFPROCEDURE_GEN_ALL_CORE_FT
PROCEDURES:  Laparoscopic hysterectomy, total, uterus 250 g or less 26-Mar-2024 15:23:28  Stephan Barber

## 2024-03-26 NOTE — BRIEF OPERATIVE NOTE - OPERATION/FINDINGS
Examination under anaesthesia revealed an anteverted uterus 10 weeks in size. Laparoscopic entry gained via cutdown superior to the umbilicus. 3 further laparoscopic port sites placed. Robot docked. Pelvic washings obtained. Total hysterectomy and bilateral salpingectomy performed without complication. Vaginal cuff sutured laparoscopically with V-lock suture. Urinary merritt removed at end of procedure.

## 2024-04-02 LAB — SURGICAL PATHOLOGY STUDY: SIGNIFICANT CHANGE UP

## 2024-04-04 ENCOUNTER — APPOINTMENT (OUTPATIENT)
Dept: GYNECOLOGIC ONCOLOGY | Facility: CLINIC | Age: 49
End: 2024-04-04

## 2024-04-04 ENCOUNTER — APPOINTMENT (OUTPATIENT)
Dept: GYNECOLOGIC ONCOLOGY | Facility: CLINIC | Age: 49
End: 2024-04-04
Payer: COMMERCIAL

## 2024-04-04 PROBLEM — U07.1 COVID-19: Chronic | Status: ACTIVE | Noted: 2023-04-20

## 2024-04-04 PROBLEM — G04.90 ENCEPHALITIS AND ENCEPHALOMYELITIS, UNSPECIFIED: Chronic | Status: ACTIVE | Noted: 2024-03-25

## 2024-04-04 PROCEDURE — 99024 POSTOP FOLLOW-UP VISIT: CPT

## 2024-04-04 NOTE — PHYSICAL EXAM
[Normal] : No focal neurologic defects observed [de-identified] : incisions c/d/i, mildly hyperpigmented [Fully active, able to carry on all pre-disease performance without restriction] : Status 0 - Fully active, able to carry on all pre-disease performance without restriction

## 2024-04-04 NOTE — HISTORY OF PRESENT ILLNESS
[FreeTextEntry1] : Problem 1) Fibroid uterus 2) Heavy menses  Previous Therapy 1)  Pelvic US 10/9/23   a) Uterus 9cm with 4.3cm intramural fibroid posterior fundus 2) S/P RA-TLH, BS 3/26/24   a) Uterus, cervix, bilateral fallopian tubes; total hysterectomy and bilateral salpingectomy: -   Myometrium with leiomyoma -   Proliferative endometrium -   Unremarkable cervix and bilateral fallopian tubes  47yo s/p VICK-FLORA, BS here for 1 week post-op visit. Pt reports forgetting that she has an allergy to the surgical glue, so she now has a mild rash and itching around incision sites. Pt using a topical cream for pruritis and Benadryl which helps.  Pain controlled. + BMs. Pathology discussed.    History: ObHx:  x2 (20yo at college, 9yo) GynHx: -H/o HPV. Last Pap , normal per patient. PMH: Post-COVID encephalitis (complete recovered now) PSH: Abdominoplasty , gastric sleeve 2023 Meds: Venlafaxine 75mg, memantine (post-COVID encephalitis) Allergies: Pineapple, kiwi, cherry. SH: Ex-cigarette smoker, social alcohol. 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  FMH: Non-contributory  Surveillance: Last mammogram: Last mammogram 10/23/23 Last colonoscopy: Due to schedule.

## 2024-04-04 NOTE — REASON FOR VISIT
[Home] : at home, [unfilled] , at the time of the visit. [Medical Office: ([facility])___] : at the medical office located in  [Patient] : the patient [Self] : self [FreeTextEntry1] : 1 week post-op

## 2024-04-04 NOTE — DISCUSSION/SUMMARY
[FreeTextEntry1] : S/P KHUSHBU, BS Meeting post-op milestones Benign pathology [] recommend using alcohol and removing the surgical glue. Continue with meds for symptoms. [] post-op precautions given [] f/u with Dr. Jerez in 3 weeks

## 2024-04-05 ENCOUNTER — NON-APPOINTMENT (OUTPATIENT)
Age: 49
End: 2024-04-05

## 2024-04-05 ENCOUNTER — TRANSCRIPTION ENCOUNTER (OUTPATIENT)
Age: 49
End: 2024-04-05

## 2024-04-23 ENCOUNTER — TRANSCRIPTION ENCOUNTER (OUTPATIENT)
Age: 49
End: 2024-04-23

## 2024-04-23 RX ORDER — VENLAFAXINE HYDROCHLORIDE 75 MG/1
75 CAPSULE, EXTENDED RELEASE ORAL DAILY
Qty: 90 | Refills: 1 | Status: ACTIVE | COMMUNITY
Start: 2024-04-23 | End: 1900-01-01

## 2024-04-26 ENCOUNTER — APPOINTMENT (OUTPATIENT)
Age: 49
End: 2024-04-26

## 2024-05-15 ENCOUNTER — APPOINTMENT (OUTPATIENT)
Dept: GYNECOLOGIC ONCOLOGY | Facility: CLINIC | Age: 49
End: 2024-05-15
Payer: COMMERCIAL

## 2024-05-15 VITALS
SYSTOLIC BLOOD PRESSURE: 119 MMHG | HEART RATE: 76 BPM | WEIGHT: 163 LBS | DIASTOLIC BLOOD PRESSURE: 85 MMHG | TEMPERATURE: 97.4 F | HEIGHT: 65 IN | BODY MASS INDEX: 27.16 KG/M2 | OXYGEN SATURATION: 96 %

## 2024-05-15 DIAGNOSIS — D21.9 BENIGN NEOPLASM OF CONNECTIVE AND OTHER SOFT TISSUE, UNSPECIFIED: ICD-10-CM

## 2024-05-15 PROCEDURE — 99024 POSTOP FOLLOW-UP VISIT: CPT

## 2024-05-15 NOTE — PHYSICAL EXAM
[Chaperone Present] : A chaperone was present in the examining room during all aspects of the physical examination [Absent] : Adnexa(ae): Absent [Normal] : Anus and perineum: Normal sphincter tone, no masses, no prolapse. [Fully active, able to carry on all pre-disease performance without restriction] : Status 0 - Fully active, able to carry on all pre-disease performance without restriction

## 2024-05-15 NOTE — DISCUSSION/SUMMARY
[Reviewed Clinical Lab Test(s)] : Results of clinical tests were reviewed. [FreeTextEntry1] : healing well discussed pathology - benign will transition to menopause at some point discussed needing 20 years of PAP screening even after total hysterectomy given h/o abnormal PAP

## 2024-05-15 NOTE — HISTORY OF PRESENT ILLNESS
[FreeTextEntry1] : Problem 1) Fibroid uterus 2) Heavy menses  Previous Therapy 1)  Pelvic US 10/9/23   a) Uterus 9cm with 4.3cm intramural fibroid posterior fundus 2) S/P RA-TLH, BS 3/26/24   a) Uterus, cervix, bilateral fallopian tubes; total hysterectomy and bilateral salpingectomy: -   Myometrium with leiomyoma -   Proliferative endometrium -   Unremarkable cervix and bilateral fallopian tubes  47yo s/p RA-TLH, BS here for 1 month post-op visit. Feeling well, some pressure in the pelvic area, no urinary frequency or dysuria. no vaginal bleeding.   History: ObHx:  x2 (20yo at college, 9yo) GynHx: -H/o HPV. Last Pap , normal per patient. PMH: Post-COVID encephalitis (complete recovered now) PSH: Abdominoplasty , gastric sleeve 2023 Meds: Venlafaxine 75mg, memantine (post-COVID encephalitis) Allergies: Pineapple, kiwi, cherry. SH: Ex-cigarette smoker, social alcohol. 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  FMH: Non-contributory  Surveillance: Last mammogram: Last mammogram 10/23/23 Last colonoscopy: Due to schedule.

## 2024-05-24 ENCOUNTER — APPOINTMENT (OUTPATIENT)
Dept: HEART AND VASCULAR | Facility: CLINIC | Age: 49
End: 2024-05-24

## 2024-06-05 ENCOUNTER — APPOINTMENT (OUTPATIENT)
Dept: OTOLARYNGOLOGY | Facility: CLINIC | Age: 49
End: 2024-06-05
Payer: SELF-PAY

## 2024-06-05 VITALS
WEIGHT: 162 LBS | OXYGEN SATURATION: 100 % | DIASTOLIC BLOOD PRESSURE: 76 MMHG | BODY MASS INDEX: 26.99 KG/M2 | TEMPERATURE: 97.9 F | HEART RATE: 74 BPM | RESPIRATION RATE: 18 BRPM | HEIGHT: 65 IN | SYSTOLIC BLOOD PRESSURE: 114 MMHG

## 2024-06-05 DIAGNOSIS — Z41.1 ENCOUNTER FOR COSMETIC SURGERY: ICD-10-CM

## 2024-06-05 PROCEDURE — 99499A: CUSTOM | Mod: NC

## 2024-06-05 PROCEDURE — D0090A COSMETIC BOTOX: CUSTOM

## 2024-06-05 NOTE — PROCEDURE
[FreeTextEntry3] : .  Procedure Note: Chemodenervation of Bilateral Cervicofacial Musculature Indications: 1. cosmesis Anesthesia: None Consent: Risks, benefits, and alternatives were discussed with the patient and informed consent was obtained. Details: The planned areas of injection with gently cleansed with alcohol swabs and marked as appropriate. A total of 48u of Botox was placed into bilateral cervicofacial musculature (50u/mL, NDC [address]-15, Exp [id number], Lot 4/2026) with 14u across frontalis, 12u in /procerus, 9u in each lateral oculi, 2u at each upper lip vermillion. Disposition: Ice was placed. The patient tolerated the procedure without issue.

## 2024-06-05 NOTE — PHYSICAL EXAM
[TextEntry] : GEN: well nourished, well developed, no acute distress. VOICE: normal tone, quality, and volume, no hoarseness. HEAD: normocephalic, atraumatic, no TMJ pain or jaw clicking FACE: symmetric and motion intact, Lehman 5 w/ evidence of diffuse facial hyperpigmentation. Horizontal frontalis rhytids both dynamic and at rest, dynamic glabellar/predominantly  rhytids, dynamic lateral oculi rhytids. Mild bilateral marionette and nasolabial lines SKIN: intact, warm, and dry NEURO: alert & oriented x4

## 2024-06-05 NOTE — ASSESSMENT
[FreeTextEntry1] :  Plan: Wound care instructions provided RTC 2 weeks Recommended evaluation with dermatology for options for facial hyperpigmentation  -- Ann Quan MD Facial Plastic & Reconstructive Surgery

## 2024-06-05 NOTE — HISTORY OF PRESENT ILLNESS
[de-identified] : Ms. ELOISE FLOREZ is a pleasant 48 year female presenting today as self-referral for facial rejuvenation.    Pt is bothered by horizontal and vertical forehead rhytids and periocular rhytids. She has undergone Botox in the past with good results. Most recently was in June where they treated the frontalis, glabellar, and lateral oculi regions. In addition, she is also bothered by some smile lines in the areas around her mouth. She also has been dealing with some melasma for a long time that has not gone away. She has tried hydroquinone and other bleaching agents with only temporary improvement.    Past medical/surgical history is unremarkable. Nonsmoker. 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

## 2024-06-06 RX ORDER — MEMANTINE HYDROCHLORIDE 5 MG/1
5 TABLET, FILM COATED ORAL
Qty: 180 | Refills: 1 | Status: ACTIVE | COMMUNITY
Start: 2020-09-17 | End: 1900-01-01

## 2024-06-19 ENCOUNTER — APPOINTMENT (OUTPATIENT)
Dept: OTOLARYNGOLOGY | Facility: CLINIC | Age: 49
End: 2024-06-19

## 2024-08-21 ENCOUNTER — APPOINTMENT (OUTPATIENT)
Dept: OTOLARYNGOLOGY | Facility: CLINIC | Age: 49
End: 2024-08-21

## 2024-10-03 ENCOUNTER — RX RENEWAL (OUTPATIENT)
Age: 49
End: 2024-10-03

## 2024-10-15 LAB
RAPID RVP RESULT: NOT DETECTED
SARS-COV-2 RNA PNL RESP NAA+PROBE: NOT DETECTED

## 2024-10-17 LAB
RAPID RVP RESULT: DETECTED
SARS-COV-2 RNA PNL RESP NAA+PROBE: DETECTED

## 2024-11-20 NOTE — REVIEW OF SYSTEMS
[Negative] : Heme/Lymph
Render In Strict Bullet Format?: No
Detail Level: Zone
Plan: Mupirocin 2 % topical ointment apply twice daily to wounds\\nClindamycin HCl 300 mg Take one capsule three times a day for three days, first dose take two (discontinue medication and notify MD if you develop diarrhea)\\nFlorastor probiotics take per package instructions while on Clindamycin\\nApply ice today when arrive home

## 2024-12-10 LAB
INFLUENZA A RESULT: NOT DETECTED
INFLUENZA B RESULT: NOT DETECTED
RESP SYN VIRUS RESULT: NOT DETECTED
SARS-COV-2 RESULT: NOT DETECTED

## 2024-12-11 ENCOUNTER — RX RENEWAL (OUTPATIENT)
Age: 49
End: 2024-12-11

## 2025-03-07 ENCOUNTER — RX RENEWAL (OUTPATIENT)
Age: 50
End: 2025-03-07

## 2025-03-24 ENCOUNTER — NON-APPOINTMENT (OUTPATIENT)
Age: 50
End: 2025-03-24

## 2025-03-24 ENCOUNTER — APPOINTMENT (OUTPATIENT)
Dept: HEART AND VASCULAR | Facility: CLINIC | Age: 50
End: 2025-03-24
Payer: COMMERCIAL

## 2025-03-24 VITALS
WEIGHT: 171 LBS | SYSTOLIC BLOOD PRESSURE: 118 MMHG | DIASTOLIC BLOOD PRESSURE: 75 MMHG | BODY MASS INDEX: 28.49 KG/M2 | HEIGHT: 65 IN | HEART RATE: 73 BPM | OXYGEN SATURATION: 98 %

## 2025-03-24 DIAGNOSIS — Z00.00 ENCOUNTER FOR GENERAL ADULT MEDICAL EXAMINATION W/OUT ABNORMAL FINDINGS: ICD-10-CM

## 2025-03-24 DIAGNOSIS — G45.9 TRANSIENT CEREBRAL ISCHEMIC ATTACK, UNSPECIFIED: ICD-10-CM

## 2025-03-24 PROCEDURE — 36415 COLL VENOUS BLD VENIPUNCTURE: CPT

## 2025-03-24 PROCEDURE — 93000 ELECTROCARDIOGRAM COMPLETE: CPT

## 2025-03-24 PROCEDURE — 99396 PREV VISIT EST AGE 40-64: CPT | Mod: 25

## 2025-03-24 RX ORDER — PNV NO.95/FERROUS FUM/FOLIC AC 28MG-0.8MG
500-3.125 TABLET ORAL
Refills: 0 | Status: ACTIVE | COMMUNITY
Start: 2025-03-24

## 2025-03-25 LAB
ALBUMIN SERPL ELPH-MCNC: 4.5 G/DL
ALP BLD-CCNC: 57 U/L
ALT SERPL-CCNC: 9 U/L
ANION GAP SERPL CALC-SCNC: 10 MMOL/L
AST SERPL-CCNC: 14 U/L
BASOPHILS # BLD AUTO: 0.03 K/UL
BASOPHILS NFR BLD AUTO: 0.5 %
BILIRUB DIRECT SERPL-MCNC: 0.1 MG/DL
BILIRUB INDIRECT SERPL-MCNC: 0.6 MG/DL
BILIRUB SERPL-MCNC: 0.7 MG/DL
BUN SERPL-MCNC: 13 MG/DL
CALCIUM SERPL-MCNC: 9.2 MG/DL
CHLORIDE SERPL-SCNC: 104 MMOL/L
CHOLEST SERPL-MCNC: 231 MG/DL
CO2 SERPL-SCNC: 26 MMOL/L
CREAT SERPL-MCNC: 0.74 MG/DL
EGFRCR SERPLBLD CKD-EPI 2021: 100 ML/MIN/1.73M2
EOSINOPHIL # BLD AUTO: 0.02 K/UL
EOSINOPHIL NFR BLD AUTO: 0.3 %
ESTIMATED AVERAGE GLUCOSE: 94 MG/DL
GLUCOSE SERPL-MCNC: 92 MG/DL
HBA1C MFR BLD HPLC: 4.9 %
HCT VFR BLD CALC: 38.7 %
HDLC SERPL-MCNC: 55 MG/DL
HGB BLD-MCNC: 12.9 G/DL
IMM GRANULOCYTES NFR BLD AUTO: 0.3 %
LDLC SERPL-MCNC: 148 MG/DL
LYMPHOCYTES # BLD AUTO: 1.97 K/UL
LYMPHOCYTES NFR BLD AUTO: 33.3 %
MAN DIFF?: NORMAL
MCHC RBC-ENTMCNC: 30.8 PG
MCHC RBC-ENTMCNC: 33.3 G/DL
MCV RBC AUTO: 92.4 FL
MONOCYTES # BLD AUTO: 0.39 K/UL
MONOCYTES NFR BLD AUTO: 6.6 %
NEUTROPHILS # BLD AUTO: 3.48 K/UL
NEUTROPHILS NFR BLD AUTO: 59 %
NONHDLC SERPL-MCNC: 175 MG/DL
PLATELET # BLD AUTO: 179 K/UL
POTASSIUM SERPL-SCNC: 4.8 MMOL/L
PROT SERPL-MCNC: 7.2 G/DL
RBC # BLD: 4.19 M/UL
RBC # FLD: 12.8 %
SODIUM SERPL-SCNC: 140 MMOL/L
TRIGL SERPL-MCNC: 150 MG/DL
TSH SERPL-ACNC: 1.38 UIU/ML
WBC # FLD AUTO: 5.91 K/UL

## 2025-04-11 ENCOUNTER — RX RENEWAL (OUTPATIENT)
Age: 50
End: 2025-04-11

## 2025-04-15 ENCOUNTER — NON-APPOINTMENT (OUTPATIENT)
Age: 50
End: 2025-04-15

## 2025-04-16 ENCOUNTER — NON-APPOINTMENT (OUTPATIENT)
Age: 50
End: 2025-04-16

## 2025-04-17 ENCOUNTER — APPOINTMENT (OUTPATIENT)
Dept: NEUROLOGY | Facility: CLINIC | Age: 50
End: 2025-04-17

## 2025-04-17 VITALS
SYSTOLIC BLOOD PRESSURE: 106 MMHG | BODY MASS INDEX: 27.32 KG/M2 | HEIGHT: 65 IN | DIASTOLIC BLOOD PRESSURE: 68 MMHG | OXYGEN SATURATION: 100 % | TEMPERATURE: 97.9 F | HEART RATE: 73 BPM | WEIGHT: 164 LBS

## 2025-04-17 PROCEDURE — 99213 OFFICE O/P EST LOW 20 MIN: CPT

## 2025-05-12 ENCOUNTER — APPOINTMENT (OUTPATIENT)
Dept: OBGYN | Facility: CLINIC | Age: 50
End: 2025-05-12

## 2025-05-19 ENCOUNTER — APPOINTMENT (OUTPATIENT)
Dept: MRI IMAGING | Facility: CLINIC | Age: 50
End: 2025-05-19

## 2025-06-03 NOTE — DIETITIAN INITIAL EVALUATION ADULT - CALCULATED FROM (G/KG)
Encounter addended by: Jean-Pierre Russell MD on: 5/10/2023 4:29 PM   Actions taken: Clinical Note Signed PA for Crestor 20 MG tablet SUBMITTED to [employer]    via    []CMM-KEY:   [x]Surescripts-Case ID # [id number]   []Availity-Auth ID # NDC #   []Faxed to plan   []Other website   []Phone call Case ID #     [x]PA sent as URGENT    All office notes, labs and other pertaining documents and studies sent. Clinical questions answered. Awaiting determination from insurance company.     Turnaround time for your insurance to make a decision on your Prior Authorization can take 7-21 business days.                  [phone]

## 2025-07-21 ENCOUNTER — NON-APPOINTMENT (OUTPATIENT)
Age: 50
End: 2025-07-21

## 2025-07-21 ENCOUNTER — APPOINTMENT (OUTPATIENT)
Dept: HEART AND VASCULAR | Facility: CLINIC | Age: 50
End: 2025-07-21
Payer: COMMERCIAL

## 2025-07-21 VITALS
DIASTOLIC BLOOD PRESSURE: 68 MMHG | HEIGHT: 65 IN | BODY MASS INDEX: 26.66 KG/M2 | TEMPERATURE: 97.2 F | WEIGHT: 160 LBS | SYSTOLIC BLOOD PRESSURE: 112 MMHG | HEART RATE: 76 BPM | OXYGEN SATURATION: 99 %

## 2025-07-21 VITALS — DIASTOLIC BLOOD PRESSURE: 81 MMHG | SYSTOLIC BLOOD PRESSURE: 128 MMHG

## 2025-07-21 VITALS — SYSTOLIC BLOOD PRESSURE: 117 MMHG | DIASTOLIC BLOOD PRESSURE: 81 MMHG

## 2025-07-21 DIAGNOSIS — R07.9 CHEST PAIN, UNSPECIFIED: ICD-10-CM

## 2025-07-21 DIAGNOSIS — R00.2 PALPITATIONS: ICD-10-CM

## 2025-07-21 DIAGNOSIS — R53.83 OTHER FATIGUE: ICD-10-CM

## 2025-07-21 DIAGNOSIS — E78.00 PURE HYPERCHOLESTEROLEMIA, UNSPECIFIED: ICD-10-CM

## 2025-07-21 PROCEDURE — 99214 OFFICE O/P EST MOD 30 MIN: CPT | Mod: 25

## 2025-07-21 PROCEDURE — 36415 COLL VENOUS BLD VENIPUNCTURE: CPT

## 2025-07-21 PROCEDURE — 93000 ELECTROCARDIOGRAM COMPLETE: CPT

## 2025-07-21 RX ORDER — TRANEXAMIC ACID 650 MG/1
650 TABLET ORAL DAILY
Refills: 0 | Status: ACTIVE | COMMUNITY

## 2025-07-22 LAB
25(OH)D3 SERPL-MCNC: 32.8 NG/ML
ALBUMIN SERPL ELPH-MCNC: 4.3 G/DL
ALP BLD-CCNC: 55 U/L
ALT SERPL-CCNC: 10 U/L
ANION GAP SERPL CALC-SCNC: 14 MMOL/L
AST SERPL-CCNC: 11 U/L
BILIRUB DIRECT SERPL-MCNC: 0.17 MG/DL
BILIRUB INDIRECT SERPL-MCNC: 0.2 MG/DL
BILIRUB SERPL-MCNC: 0.4 MG/DL
BUN SERPL-MCNC: 14 MG/DL
CALCIUM SERPL-MCNC: 9.4 MG/DL
CHLORIDE SERPL-SCNC: 102 MMOL/L
CHOLEST SERPL-MCNC: 203 MG/DL
CO2 SERPL-SCNC: 22 MMOL/L
CREAT SERPL-MCNC: 0.59 MG/DL
EGFRCR SERPLBLD CKD-EPI 2021: 110 ML/MIN/1.73M2
ESTIMATED AVERAGE GLUCOSE: 85 MG/DL
FOLATE SERPL-MCNC: 12.4 NG/ML
GLUCOSE SERPL-MCNC: 95 MG/DL
HBA1C MFR BLD HPLC: 4.6 %
HCT VFR BLD CALC: 35.3 %
HDLC SERPL-MCNC: 56 MG/DL
HGB BLD-MCNC: 12.1 G/DL
LDLC SERPL-MCNC: 93 MG/DL
MCHC RBC-ENTMCNC: 32 PG
MCHC RBC-ENTMCNC: 34.3 G/DL
MCV RBC AUTO: 93.4 FL
NONHDLC SERPL-MCNC: 147 MG/DL
PLATELET # BLD AUTO: 164 K/UL
POTASSIUM SERPL-SCNC: 4.5 MMOL/L
PROT SERPL-MCNC: 6.9 G/DL
RBC # BLD: 3.78 M/UL
RBC # FLD: 12.8 %
SODIUM SERPL-SCNC: 138 MMOL/L
TRIGL SERPL-MCNC: 327 MG/DL
TSH SERPL-ACNC: 0.88 UIU/ML
VIT B12 SERPL-MCNC: 1144 PG/ML
WBC # FLD AUTO: 7.13 K/UL

## 2025-07-24 ENCOUNTER — APPOINTMENT (OUTPATIENT)
Dept: HEART AND VASCULAR | Facility: CLINIC | Age: 50
End: 2025-07-24

## 2025-09-09 ENCOUNTER — RX RENEWAL (OUTPATIENT)
Age: 50
End: 2025-09-09

## 2025-09-09 ENCOUNTER — TRANSCRIPTION ENCOUNTER (OUTPATIENT)
Age: 50
End: 2025-09-09

## (undated) DEVICE — XI OBTURATOR OPTICAL BLADELESS 8MM

## (undated) DEVICE — XI DRAPE COLUMN

## (undated) DEVICE — XI TIP COVER

## (undated) DEVICE — FORCEP RADIAL JAW 4 W NDL 2.2MM 2.8MM 240CM ORANGE DISP

## (undated) DEVICE — TROCAR COVIDIEN VERSAPORT BLADELESS OPTICAL 12MM STANDARD

## (undated) DEVICE — DRAPE INSTRUMENT POUCH 10" X 18"

## (undated) DEVICE — PREP CHLORAPREP HI-LITE ORANGE 26ML

## (undated) DEVICE — XI SEAL UNIV 5- 8 MM

## (undated) DEVICE — XI DRAPE ARM

## (undated) DEVICE — TROCAR COVIDIEN VERSAPORT BLADELESS OPTICAL 5MM STANDARD

## (undated) DEVICE — D HELP - CLEARVIEW CLEARIFY SYSTEM

## (undated) DEVICE — DRSG GAUZE PACKTNER ROLL

## (undated) DEVICE — SUT VICRYL 0 54" TIES

## (undated) DEVICE — TROCAR APPLIED MEDICAL KII FIOS FIRST ENTRY 5MM X 100MM ADVANCED FIXATION

## (undated) DEVICE — DRSG DERMABOND 0.7ML

## (undated) DEVICE — WARMING BLANKET UPPER ADULT

## (undated) DEVICE — FOLEY TRAY 16FR 5CC LF UMETER CLOSED

## (undated) DEVICE — TUBING IV EXTENSION 30"

## (undated) DEVICE — PACK GYN WDC

## (undated) DEVICE — TROCAR COVIDIEN VERSAONE BLUNT TIP HASSAN 12MM

## (undated) DEVICE — VENODYNE/SCD SLEEVE CALF MEDIUM

## (undated) DEVICE — INSUFFLATION NDL COVIDIEN SURGINEEDLE VERESS 120MM

## (undated) DEVICE — TIP METZENBAUM SCISSOR MONOPOLAR ENDOCUT (ORANGE)

## (undated) DEVICE — SUT PDS II 2-0 27" SH

## (undated) DEVICE — SUT VICRYL 0 27" UR-6

## (undated) DEVICE — XI ENDOWRIST 12 - 8 MM CANNULA REDUCER

## (undated) DEVICE — ENDOCATCH 10MM SPECIMEN POUCH

## (undated) DEVICE — DRAPE 3/4 SHEET 52X76"

## (undated) DEVICE — SUT MONOCRYL 4-0 18" PS-2

## (undated) DEVICE — XI VESSEL SEALER

## (undated) DEVICE — MARKING PEN W RULER

## (undated) DEVICE — ELCTR BOVIE PENCIL BLADE 10FT

## (undated) DEVICE — TUBING TUR 2 PRONG

## (undated) DEVICE — SOL IRR POUR H2O 1000ML

## (undated) DEVICE — ELCTR BOVIE PENCIL HANDPIECE ROCKER SWITCH 15FT

## (undated) DEVICE — UTERINE MANIPULATOR CONMED VCARE LG 37MM

## (undated) DEVICE — POSITIONER PINK PAD PIGAZZI SYSTEM XL W ARM PROTECTOR

## (undated) DEVICE — PACK GENERAL LAPAROSCOPY

## (undated) DEVICE — XI ARM FORCEP FENESTRATED BIPOLAR 8MM

## (undated) DEVICE — NDL SPINAL 22G X 3.5" (BLACK)

## (undated) DEVICE — Device

## (undated) DEVICE — SYR LUER LOK 30CC

## (undated) DEVICE — POSITIONER FOAM EGG CRATE ULNAR 2PCS (PINK)

## (undated) DEVICE — XI STAPLER SUREFORM 60

## (undated) DEVICE — SUT VLOC 180 3-0 6" V-20 GREEN

## (undated) DEVICE — SUT VLOC 180 0 12" GS-21 GREEN

## (undated) DEVICE — APPLICATOR FOR ARISTA XL 38CM

## (undated) DEVICE — INZII RETRIEVAL SYSTEM 5MM

## (undated) DEVICE — XI 12MM AND STAPLER CANNULA SEAL

## (undated) DEVICE — TUBING STRYKER PNEUMOSURE HI FLOW INSUFFLATOR

## (undated) DEVICE — SYR CONTROL LUER LOK 10CC

## (undated) DEVICE — XI ARM NEEDLE DRIVER LARGE

## (undated) DEVICE — PACK PERI GYN

## (undated) DEVICE — UTERINE MANIPULATOR CONMED VCARE MED 34MM

## (undated) DEVICE — GOWN XL

## (undated) DEVICE — ELCTR GROUNDING PAD ADULT COVIDIEN

## (undated) DEVICE — XI ARM SCISSOR MONO CURVED

## (undated) DEVICE — XI ARM FORCEP PROGRASP 8MM

## (undated) DEVICE — SUT MONOCRYL 4-0 27" PS-2 UNDYED

## (undated) DEVICE — WECK EFX SHIELD FASCIAL CLOSURE SYSTEM

## (undated) DEVICE — TUBING STRYKER PNEUMOCLEAR SMOKE EVACUATION HIGH FLOW

## (undated) DEVICE — GLV 7 PROTEXIS (WHITE)

## (undated) DEVICE — RUMI COLPO-PNEUMO OCCLUDER

## (undated) DEVICE — DRAPE 1/2 SHEET 40X57"